# Patient Record
Sex: MALE | NOT HISPANIC OR LATINO | Employment: PART TIME | URBAN - METROPOLITAN AREA
[De-identification: names, ages, dates, MRNs, and addresses within clinical notes are randomized per-mention and may not be internally consistent; named-entity substitution may affect disease eponyms.]

---

## 2017-03-23 ENCOUNTER — TELEPHONE (OUTPATIENT)
Dept: FAMILY MEDICINE | Facility: CLINIC | Age: 47
End: 2017-03-23

## 2017-03-24 ENCOUNTER — OFFICE VISIT (OUTPATIENT)
Dept: FAMILY MEDICINE | Facility: CLINIC | Age: 47
End: 2017-03-24
Payer: OTHER GOVERNMENT

## 2017-03-24 VITALS
HEART RATE: 54 BPM | BODY MASS INDEX: 28.84 KG/M2 | SYSTOLIC BLOOD PRESSURE: 118 MMHG | WEIGHT: 179.44 LBS | OXYGEN SATURATION: 98 % | DIASTOLIC BLOOD PRESSURE: 70 MMHG | HEIGHT: 66 IN

## 2017-03-24 DIAGNOSIS — N39.0 URINARY TRACT INFECTION WITHOUT HEMATURIA, SITE UNSPECIFIED: Primary | ICD-10-CM

## 2017-03-24 DIAGNOSIS — F43.10 PTSD (POST-TRAUMATIC STRESS DISORDER): ICD-10-CM

## 2017-03-24 LAB
BILIRUB SERPL-MCNC: NORMAL MG/DL
BLOOD URINE, POC: NORMAL
COLOR, POC UA: YELLOW
GLUCOSE UR QL STRIP: NORMAL
KETONES UR QL STRIP: NORMAL
LEUKOCYTE ESTERASE URINE, POC: NORMAL
NITRITE, POC UA: NORMAL
PH, POC UA: 5
PROTEIN, POC: NORMAL
SPECIFIC GRAVITY, POC UA: 1.01
UROBILINOGEN, POC UA: NORMAL

## 2017-03-24 PROCEDURE — 99213 OFFICE O/P EST LOW 20 MIN: CPT | Mod: PBBFAC,PO | Performed by: FAMILY MEDICINE

## 2017-03-24 PROCEDURE — 81001 URINALYSIS AUTO W/SCOPE: CPT | Mod: PBBFAC,PO | Performed by: FAMILY MEDICINE

## 2017-03-24 PROCEDURE — 99999 PR PBB SHADOW E&M-EST. PATIENT-LVL III: CPT | Mod: PBBFAC,,, | Performed by: FAMILY MEDICINE

## 2017-03-24 PROCEDURE — 99204 OFFICE O/P NEW MOD 45 MIN: CPT | Mod: S$PBB,,, | Performed by: FAMILY MEDICINE

## 2017-03-24 RX ORDER — HYDROCHLOROTHIAZIDE 12.5 MG/1
12.5 TABLET ORAL DAILY
COMMUNITY
End: 2017-05-22

## 2017-03-24 RX ORDER — METOPROLOL TARTRATE 50 MG/1
50 TABLET ORAL DAILY
COMMUNITY
End: 2017-11-30

## 2017-03-24 RX ORDER — LOSARTAN POTASSIUM 100 MG/1
100 TABLET ORAL DAILY
COMMUNITY

## 2017-03-24 RX ORDER — CIPROFLOXACIN 500 MG/1
500 TABLET ORAL 2 TIMES DAILY
Qty: 20 TABLET | Refills: 1 | Status: SHIPPED | OUTPATIENT
Start: 2017-03-24 | End: 2017-03-31

## 2017-04-03 NOTE — PROGRESS NOTES
A new patient here today.  He is 46 years of age.  He has got a history of PTSD.    He was in the [employer].  He has had a recent urinary tract infection and saw   another primary care physician who did not give him any antibiotics.    PAST MEDICAL, SURGICAL AND SOCIAL HISTORY:  Reviewed.    PHYSICAL EXAMINATION:  No CVA, suprapubic or abdominal tenderness.  Chest clear.    Abdomen soft and nontender.  Pleasant male in no apparent distress.  BMI of   28.  No acute skin rash.  No muscle tenderness or joint swelling.    ASSESSMENT:  Urinary tract infection, PTSD.    We ordered urine dipstick.  We gave him Cipro 500 as directed.  We discussed   blood pressure monitoring, anti-inflammatories.  We discussed the difference   between prostatitis and prostatosis.  We will see him back in followup.  He is a   new patient.      AUTUMN/CHAD  dd: 04/02/2017 21:52:08 (CDT)  td: 04/03/2017 01:37:19 (CDT)  Doc ID   #[id number]  Job ID #[id number]    CC:

## 2017-04-13 ENCOUNTER — OFFICE VISIT (OUTPATIENT)
Dept: FAMILY MEDICINE | Facility: CLINIC | Age: 47
End: 2017-04-13
Payer: OTHER GOVERNMENT

## 2017-04-13 ENCOUNTER — LAB VISIT (OUTPATIENT)
Dept: LAB | Facility: HOSPITAL | Age: 47
End: 2017-04-13
Attending: FAMILY MEDICINE
Payer: OTHER GOVERNMENT

## 2017-04-13 VITALS
BODY MASS INDEX: 28.45 KG/M2 | WEIGHT: 177 LBS | DIASTOLIC BLOOD PRESSURE: 74 MMHG | HEART RATE: 53 BPM | SYSTOLIC BLOOD PRESSURE: 126 MMHG | HEIGHT: 66 IN

## 2017-04-13 DIAGNOSIS — N42.82 PROSTATOSIS SYNDROME: ICD-10-CM

## 2017-04-13 DIAGNOSIS — N42.82 PROSTATOSIS SYNDROME: Primary | ICD-10-CM

## 2017-04-13 DIAGNOSIS — F43.10 PTSD (POST-TRAUMATIC STRESS DISORDER): ICD-10-CM

## 2017-04-13 PROCEDURE — 99213 OFFICE O/P EST LOW 20 MIN: CPT | Mod: PBBFAC,PO | Performed by: FAMILY MEDICINE

## 2017-04-13 PROCEDURE — 87086 URINE CULTURE/COLONY COUNT: CPT

## 2017-04-13 PROCEDURE — 99214 OFFICE O/P EST MOD 30 MIN: CPT | Mod: S$PBB,,, | Performed by: FAMILY MEDICINE

## 2017-04-13 PROCEDURE — 99999 PR PBB SHADOW E&M-EST. PATIENT-LVL III: CPT | Mod: PBBFAC,,, | Performed by: FAMILY MEDICINE

## 2017-04-13 RX ORDER — PHENAZOPYRIDINE HYDROCHLORIDE 200 MG/1
200 TABLET, FILM COATED ORAL 3 TIMES DAILY PRN
Qty: 21 TABLET | Refills: 0 | Status: SHIPPED | OUTPATIENT
Start: 2017-04-13 | End: 2017-04-23

## 2017-04-13 NOTE — MR AVS SNAPSHOT
[facility]  [address] [facility] [address]  [facility] [address]  Phone: [phone]  Fax: [phone]                  Supa Bass   2017 2:40 PM   Office Visit    Description:  Male : 1970   Provider:  Zev Palomino MD   Department:  [facility]           Reason for Visit     Prostate Problem           Diagnoses this Visit        Comments    Prostatosis syndrome    -  Primary     PTSD (post-traumatic stress disorder)                To Do List           Goals (5 Years of Data)     None       These Medications        Disp Refills Start End    phenazopyridine (PYRIDIUM) 200 MG tablet 21 tablet 0 2017    Take 1 tablet (200 mg total) by mouth 3 (three) times daily as needed for Pain. - Oral    Pharmacy: [facility] [address] [place] [address] AT [facility] & [address] #: [phone]         [facility] On Call     [facility] Nurse Care Line -  Assistance  Unless otherwise directed by your provider, please contact [facility], our nurse care line that is available for  assistance.     Registered nurses in the [facility] Center provide: appointment scheduling, clinical advisement, health education, and other advisory services.  Call: [phone] (toll free)               Medications           Message regarding Medications     Verify the changes and/or additions to your medication regime listed below are the same as discussed with your clinician today.  If any of these changes or additions are incorrect, please notify your healthcare provider.        START taking these NEW medications        Refills    phenazopyridine (PYRIDIUM) 200 MG tablet 0    Sig: Take 1 tablet (200 mg total) by mouth 3 (three) times daily as needed for Pain.    Class: Print    Route: Oral           Verify that the below list of medications is an accurate representation of the medications you are currently taking.  If  "none reported, the list may be blank. If incorrect, please contact your healthcare provider. Carry this list with you in case of emergency.           Current Medications     aspirin (ECOTRIN) 81 MG EC tablet Take 81 mg by mouth once daily.    atorvastatin (LIPITOR) 40 MG tablet Take 40 mg by mouth once daily.    buPROPion (WELLBUTRIN SR) 150 MG TBSR 12 hr tablet Take 150 mg by mouth 2 (two) times daily.    esomeprazole (NEXIUM) 40 MG capsule Take 40 mg by mouth once daily.    eszopiclone (LUNESTA) 2 MG Tab Take 2 mg by mouth nightly.    hydrochlorothiazide (HYDRODIURIL) 12.5 MG Tab Take 12.5 mg by mouth once daily.    losartan (COZAAR) 100 MG tablet Take 100 mg by mouth once daily.    metoprolol tartrate (LOPRESSOR) 50 MG tablet Take 50 mg by mouth once daily.    ranitidine (ZANTAC) 150 MG tablet Take 150 mg by mouth daily as needed for Heartburn.    phenazopyridine (PYRIDIUM) 200 MG tablet Take 1 tablet (200 mg total) by mouth 3 (three) times daily as needed for Pain.           Clinical Reference Information           Your Vitals Were     BP Pulse Height Weight BMI    126/74 53 5' 6" (1.676 m) 80.3 kg (177 lb 0.5 oz) 28.57 kg/m2      Blood Pressure          Most Recent Value    BP  126/74      Allergies as of 4/13/2017     No Known Allergies      Immunizations Administered on Date of Encounter - 4/13/2017     None      Orders Placed During Today's Visit      Normal Orders This Visit    Ambulatory referral to Urology     Future Labs/Procedures Expected by Expires    Urine culture  4/13/2017 6/12/2018      [facility] Sign-Up     Activating your [facility] account is as easy as 1-2-3!     1) Visit [email or web address], select Sign Up Now, enter this activation code and your date of birth, then select Next.  [email or web address]  Expires: 5/28/2017  3:27 PM      2) Create a username and password to use when you visit [facility] in the future and select a security question in case you lose your password and select Next.    3) Enter " your e-mail address and click Sign Up!    Additional Information  If you have questions, please e-mail [email or web address] or call [phone] to talk to our [facility] staff. Remember, [facility] is NOT to be used for urgent needs. For medical emergencies, dial 911.         Language Assistance Services     ATTENTION: Language assistance services are available, free of charge. Please call [phone].      ATENCIÓN: Si habla rafyañol, tiene a lugo disposición servicios gratuitos de asistencia lingüística. Llame al [phone].     CHÚ Ý: N?u b?n nói Ti?ng Vi?t, có các d?ch v? h? tr? ngôn ng? mi?n phí dành cho b?n. G?i s? [phone].         [facility] complies with applicable Federal civil rights laws and does not discriminate on the basis of race, color, national origin, age, disability, or sex.

## 2017-04-15 LAB — BACTERIA UR CULT: NORMAL

## 2017-04-20 ENCOUNTER — TELEPHONE (OUTPATIENT)
Dept: FAMILY MEDICINE | Facility: CLINIC | Age: 47
End: 2017-04-20

## 2017-04-20 NOTE — TELEPHONE ENCOUNTER
----- Message from Purnima Sam sent at 4/19/2017  4:06 PM CDT -----  Contact: Patient  Patient called regarding referral to see urologist stated that he will received a call. Please call back at [phone]. I tried to schedule patient stated to far out. Thanks,

## 2017-04-24 NOTE — PROGRESS NOTES
Pt seen note to be done HISTORY OF PRESENT ILLNESS:  A 46-year-old male here today.  He has got a   history of prostatosis, possible prostatitis.  He also has PTSD.  He is doing   well on his present medications.  He is not having any penile discharge.  No   back pain.  No hematuria.  He has had some urgency.  He took Cipro 500 b.i.d.   for the past two weeks or so with no help.    PHYSICAL EXAMINATION:  Abdomen, penis, and scrotum unremarkable.  Chest is   clear.  Affect was pleasant and polite.  No CVA or suprapubic discomfort.  No   rash.    ASSESSMENT:  Prostatosis and PTSD.    PLAN:  We will have Urology seen.  We ordered a urine culture.  If anything   worsens, he will need to bees seen again.  We will consider doxycycline, but at   this point, I would like to see the urine culture first.      GRIFFIN  dd: 04/25/2017 14:48:07 (CDT)  td: 04/25/2017 23:25:23 (CDT)  Doc ID   #[id number]  Job ID #[id number]    CC:

## 2017-04-28 ENCOUNTER — OFFICE VISIT (OUTPATIENT)
Dept: UROLOGY | Facility: CLINIC | Age: 47
End: 2017-04-28
Payer: OTHER GOVERNMENT

## 2017-04-28 VITALS
HEIGHT: 66 IN | DIASTOLIC BLOOD PRESSURE: 87 MMHG | WEIGHT: 176.38 LBS | HEART RATE: 48 BPM | SYSTOLIC BLOOD PRESSURE: 126 MMHG | BODY MASS INDEX: 28.34 KG/M2

## 2017-04-28 DIAGNOSIS — N40.1 BPH (BENIGN PROSTATIC HYPERTROPHY) WITH URINARY OBSTRUCTION: ICD-10-CM

## 2017-04-28 DIAGNOSIS — R35.0 INCREASED URINARY FREQUENCY: Primary | ICD-10-CM

## 2017-04-28 DIAGNOSIS — N13.8 BPH (BENIGN PROSTATIC HYPERTROPHY) WITH URINARY OBSTRUCTION: ICD-10-CM

## 2017-04-28 DIAGNOSIS — R39.15 URINARY URGENCY: ICD-10-CM

## 2017-04-28 DIAGNOSIS — R39.12 WEAK URINE STREAM: ICD-10-CM

## 2017-04-28 LAB
BILIRUB SERPL-MCNC: NORMAL MG/DL
BLOOD URINE, POC: NORMAL
COLOR, POC UA: YELLOW
GLUCOSE UR QL STRIP: NORMAL
KETONES UR QL STRIP: NORMAL
LEUKOCYTE ESTERASE URINE, POC: NORMAL
NITRITE, POC UA: NORMAL
PH, POC UA: 5
PROTEIN, POC: NORMAL
SPECIFIC GRAVITY, POC UA: 1.03
UROBILINOGEN, POC UA: NORMAL

## 2017-04-28 PROCEDURE — 81002 URINALYSIS NONAUTO W/O SCOPE: CPT | Mod: PBBFAC,PO | Performed by: UROLOGY

## 2017-04-28 PROCEDURE — 99213 OFFICE O/P EST LOW 20 MIN: CPT | Mod: PBBFAC,PO | Performed by: UROLOGY

## 2017-04-28 PROCEDURE — 99204 OFFICE O/P NEW MOD 45 MIN: CPT | Mod: S$PBB,,, | Performed by: UROLOGY

## 2017-04-28 PROCEDURE — 99999 PR PBB SHADOW E&M-EST. PATIENT-LVL III: CPT | Mod: PBBFAC,,, | Performed by: UROLOGY

## 2017-04-28 RX ORDER — PHENAZOPYRIDINE HYDROCHLORIDE 200 MG/1
200 TABLET, FILM COATED ORAL 3 TIMES DAILY PRN
COMMUNITY
End: 2017-04-28 | Stop reason: SDUPTHER

## 2017-04-28 RX ORDER — PHENAZOPYRIDINE HYDROCHLORIDE 200 MG/1
200 TABLET, FILM COATED ORAL 3 TIMES DAILY PRN
Qty: 30 TABLET | Refills: 1 | Status: SHIPPED | OUTPATIENT
Start: 2017-04-28 | End: 2017-06-23

## 2017-04-28 NOTE — LETTER
April 28, 2017      Zev Palomino MD  [address] [facility] [address] [place] [address]           [facility]  [address] [facility] [address] [place] [address]  Phone: [phone]          Patient: Supa Bass   MR Number: [id number]   YOB: 1970   Date of Visit: 4/28/2017       Dear Dr. Zev Palomino:    Thank you for referring Supa Bass to me for evaluation. Attached you will find relevant portions of my assessment and plan of care.    If you have questions, please do not hesitate to call me. I look forward to following Supa Bass along with you.    Sincerely,    ARELIS Wang MD    Enclosure  CC:  No Recipients    If you would like to receive this communication electronically, please contact [email or web address] or [phone] to request more information on [facility] access.    For providers and/or their staff who would like to refer a patient to [facility], please contact us through our one-stop-shop provider referral line, [facility], at [phone].    If you feel you have received this communication in error or would no longer like to receive these types of communications, please e-mail [email or web address]

## 2017-04-28 NOTE — PROGRESS NOTES
Subjective:       Patient ID: Supa Bass is a 46 y.o. male.    Chief Complaint: Prostatitis    HPI     46 year old with a 4 month history of urinary urgency, weak flow and straining to urinate.  He was initially seen by his PCP at [facility].  He then had shoulder surgery and was lost to follow up.  He then established new PCP at here.  He was treated for prostatitis with 20 days of Cipro.  He had no improvement in his symptoms.  He also complains of an achiness in testes (back of left testes.  He was given pyridium which seems to improve his symptoms.  He denies gross hematuria and no dysuria.  Urine culture is negative.  He had history of stones 20 years ago passed.   His symptoms are intermittent.  No constipation.   [facility]  No previous UTIs.  No STDs.  No new sexual partners he denies urethral discharge.  Urine dipstick shows negative for all components.    Past Medical History:   Diagnosis Date    GERD (gastroesophageal reflux disease)     Hearing loss     HTN (hypertension)     Hyperlipidemia     RAUL (obstructive sleep apnea)     CPAP at night    PTSD (post-traumatic stress disorder)      Past Surgical History:   Procedure Laterality Date    ELBOW SURGERY      right    FINGER SURGERY      right ring finger    FOOT SURGERY      right       Current Outpatient Prescriptions:     aspirin (ECOTRIN) 81 MG EC tablet, Take 81 mg by mouth once daily., Disp: , Rfl:     atorvastatin (LIPITOR) 40 MG tablet, Take 40 mg by mouth once daily., Disp: , Rfl:     buPROPion (WELLBUTRIN SR) 150 MG TBSR 12 hr tablet, Take 150 mg by mouth 2 (two) times daily., Disp: , Rfl:     esomeprazole (NEXIUM) 40 MG capsule, Take 40 mg by mouth once daily., Disp: , Rfl:     eszopiclone (LUNESTA) 2 MG Tab, Take 2 mg by mouth nightly., Disp: , Rfl:     losartan (COZAAR) 100 MG tablet, Take 100 mg by mouth once daily., Disp: , Rfl:     metoprolol tartrate (LOPRESSOR) 50 MG tablet, Take 50 mg by mouth  once daily., Disp: , Rfl:     phenazopyridine (PYRIDIUM) 200 MG tablet, Take 1 tablet (200 mg total) by mouth 3 (three) times daily as needed for Pain., Disp: 30 tablet, Rfl: 1    ranitidine (ZANTAC) 150 MG tablet, Take 150 mg by mouth daily as needed for Heartburn., Disp: , Rfl:     hydrochlorothiazide (HYDRODIURIL) 12.5 MG Tab, Take 12.5 mg by mouth once daily., Disp: , Rfl:       Review of Systems   Constitutional: Negative for fever.   Eyes: Negative for visual disturbance.   Respiratory: Negative for shortness of breath.    Cardiovascular: Negative for chest pain.   Gastrointestinal: Negative for nausea.   Genitourinary: Positive for frequency. Negative for dysuria and hematuria.   Musculoskeletal: Negative for gait problem.   Skin: Negative for rash.   Neurological: Negative for seizures.   Psychiatric/Behavioral: Negative for confusion.       Objective:      Physical Exam   Constitutional: He is oriented to person, place, and time. He appears well-developed and well-nourished.   HENT:   Head: Normocephalic and atraumatic.   Eyes: Conjunctivae are normal.   Cardiovascular: Normal rate.    Pulmonary/Chest: Effort normal.   Abdominal: Hernia confirmed negative in the right inguinal area and confirmed negative in the left inguinal area.   Genitourinary: Testes normal and penis normal. Rectal exam shows no mass and anal tone normal. Prostate is enlarged (30g s/s/a). Prostate is not tender.   Musculoskeletal: Normal range of motion.   Lymphadenopathy: No inguinal adenopathy noted on the right or left side.   Neurological: He is alert and oriented to person, place, and time.   Skin: Skin is warm and dry. No rash noted.   Psychiatric: He has a normal mood and affect.   Vitals reviewed.      Assessment:       1. Increased urinary frequency    2. Urinary urgency    3. Weak urine stream    4. BPH (benign prostatic hypertrophy) with urinary obstruction        Plan:       Increased urinary frequency  -     US  Retroperitoneal Complete (Kidney and; Future; Expected date: 4/28/17    Urinary urgency    Weak urine stream    BPH (benign prostatic hypertrophy) with urinary obstruction    Other orders  -     phenazopyridine (PYRIDIUM) 200 MG tablet; Take 1 tablet (200 mg total) by mouth 3 (three) times daily as needed for Pain.  Dispense: 30 tablet; Refill: 1      Source of symptoms unclear.  ?stone.  Rec SHIRAZ and cystoscopy.

## 2017-05-17 ENCOUNTER — HOSPITAL ENCOUNTER (OUTPATIENT)
Dept: RADIOLOGY | Facility: HOSPITAL | Age: 47
Discharge: HOME | End: 2017-05-17
Attending: UROLOGY
Payer: OTHER GOVERNMENT

## 2017-05-17 DIAGNOSIS — R35.0 INCREASED URINARY FREQUENCY: ICD-10-CM

## 2017-05-17 PROCEDURE — 76770 US EXAM ABDO BACK WALL COMP: CPT | Mod: TC,PO

## 2017-05-17 PROCEDURE — 76770 US EXAM ABDO BACK WALL COMP: CPT | Mod: 26,,, | Performed by: RADIOLOGY

## 2017-05-18 ENCOUNTER — TELEPHONE (OUTPATIENT)
Dept: UROLOGY | Facility: CLINIC | Age: 47
End: 2017-05-18

## 2017-05-22 ENCOUNTER — TELEPHONE (OUTPATIENT)
Dept: FAMILY MEDICINE | Facility: CLINIC | Age: 47
End: 2017-05-22

## 2017-05-22 ENCOUNTER — PATIENT MESSAGE (OUTPATIENT)
Dept: FAMILY MEDICINE | Facility: CLINIC | Age: 47
End: 2017-05-22

## 2017-05-22 ENCOUNTER — PROCEDURE VISIT (OUTPATIENT)
Dept: UROLOGY | Facility: CLINIC | Age: 47
End: 2017-05-22
Payer: OTHER GOVERNMENT

## 2017-05-22 VITALS
BODY MASS INDEX: 28.7 KG/M2 | SYSTOLIC BLOOD PRESSURE: 118 MMHG | HEART RATE: 51 BPM | HEIGHT: 66 IN | DIASTOLIC BLOOD PRESSURE: 82 MMHG | WEIGHT: 178.56 LBS

## 2017-05-22 DIAGNOSIS — R39.15 URINARY URGENCY: ICD-10-CM

## 2017-05-22 DIAGNOSIS — N40.1 BPH (BENIGN PROSTATIC HYPERTROPHY) WITH URINARY OBSTRUCTION: ICD-10-CM

## 2017-05-22 DIAGNOSIS — N13.8 BPH (BENIGN PROSTATIC HYPERTROPHY) WITH URINARY OBSTRUCTION: ICD-10-CM

## 2017-05-22 DIAGNOSIS — R35.0 INCREASED URINARY FREQUENCY: ICD-10-CM

## 2017-05-22 DIAGNOSIS — R39.12 WEAK URINE STREAM: ICD-10-CM

## 2017-05-22 PROCEDURE — 52000 CYSTOURETHROSCOPY: CPT | Mod: PBBFAC,PO | Performed by: UROLOGY

## 2017-05-22 PROCEDURE — 81002 URINALYSIS NONAUTO W/O SCOPE: CPT | Mod: PBBFAC,PO | Performed by: UROLOGY

## 2017-05-22 RX ORDER — CYCLOBENZAPRINE HCL 10 MG
TABLET ORAL
COMMUNITY
Start: 2017-03-07 | End: 2017-05-22

## 2017-05-22 NOTE — TELEPHONE ENCOUNTER
"Good Afternoon Dr. Palomino,     I have been experiencing some issues with my CPAP machine, specifically in the last month or so I have been feeling out of breath when I use it and "snoring" through the mask.  I contacted [facility], who is the provider for my sleep apnea needs, and Lydia stated that there may be a need to increase my CPAP pressure.  To do so, they require a request from my primary care physician.  Lydia stated that they would be sending your office a fax requesting an order for a pressure change so they could fix the problem.  I am requesting that you provide them with this order.  If you have questions or concerns, I will be glad to make an appointment or speak with you or your staff as required.  Thank you in advance for your assistance with this matter.     SIncerely,     Supa Bass   [phone]           "

## 2017-05-22 NOTE — PROCEDURES
"Cystoscopy  Date/Time: 5/22/2017 10:06 AM  Performed by: ARELIS SETH  Authorized by: ARELIS SETH     Consent Done?:  Yes (Written)  Time out: Immediately prior to procedure a "time out" was called to verify the correct patient, procedure, equipment, support staff and site/side marked as required.    Indications: overactive bladder and BPH    Position:  Supine  Anesthesia:  Lidocaine jelly  Preparation: Patient was prepped and draped in usual sterile fashion      Scope type:  Flexible cystoscope    Patient tolerance:  Patient tolerated the procedure well with no immediate complications     The flexible cystoscope was placed into the urethra and carefully advanced into the bladder.  A careful cystoscopic exam was then performed.  The entire bladder mucosa was systematically visualized.  Findings include mild bladder wall trabeculation.  There were no lesions, masses foreign bodies or stones.   Each ureteral orifices were visualized and both had clear efflux of urine.   The cystoscope was then removed and I examined the entire length of the urethra.  There was mild trilobar enlargement of the prostate otherwise the urethra appeared normal.  He tolerated the procedure well.  There were no complications    Impression:  Normal cystoscopy.        "

## 2017-05-24 ENCOUNTER — PATIENT MESSAGE (OUTPATIENT)
Dept: FAMILY MEDICINE | Facility: CLINIC | Age: 47
End: 2017-05-24

## 2017-05-30 ENCOUNTER — PATIENT MESSAGE (OUTPATIENT)
Dept: UROLOGY | Facility: CLINIC | Age: 47
End: 2017-05-30

## 2017-06-01 ENCOUNTER — PATIENT MESSAGE (OUTPATIENT)
Dept: UROLOGY | Facility: CLINIC | Age: 47
End: 2017-06-01

## 2017-06-01 RX ORDER — TAMSULOSIN HYDROCHLORIDE 0.4 MG/1
0.4 CAPSULE ORAL DAILY
Qty: 30 CAPSULE | Refills: 11 | Status: SHIPPED | OUTPATIENT
Start: 2017-06-01 | End: 2017-06-23

## 2017-06-20 ENCOUNTER — PATIENT MESSAGE (OUTPATIENT)
Dept: FAMILY MEDICINE | Facility: CLINIC | Age: 47
End: 2017-06-20

## 2017-06-23 ENCOUNTER — OFFICE VISIT (OUTPATIENT)
Dept: FAMILY MEDICINE | Facility: CLINIC | Age: 47
End: 2017-06-23
Payer: OTHER GOVERNMENT

## 2017-06-23 VITALS
RESPIRATION RATE: 18 BRPM | DIASTOLIC BLOOD PRESSURE: 82 MMHG | OXYGEN SATURATION: 96 % | WEIGHT: 181.88 LBS | BODY MASS INDEX: 29.23 KG/M2 | HEART RATE: 52 BPM | HEIGHT: 66 IN | SYSTOLIC BLOOD PRESSURE: 128 MMHG

## 2017-06-23 DIAGNOSIS — I10 ESSENTIAL HYPERTENSION: ICD-10-CM

## 2017-06-23 DIAGNOSIS — G47.33 OSA ON CPAP: Primary | ICD-10-CM

## 2017-06-23 DIAGNOSIS — F43.10 PTSD (POST-TRAUMATIC STRESS DISORDER): ICD-10-CM

## 2017-06-23 PROCEDURE — 99999 PR PBB SHADOW E&M-EST. PATIENT-LVL III: CPT | Mod: PBBFAC,,, | Performed by: INTERNAL MEDICINE

## 2017-06-23 PROCEDURE — 99214 OFFICE O/P EST MOD 30 MIN: CPT | Mod: S$PBB,,, | Performed by: INTERNAL MEDICINE

## 2017-06-23 PROCEDURE — 99213 OFFICE O/P EST LOW 20 MIN: CPT | Mod: PBBFAC,PO | Performed by: INTERNAL MEDICINE

## 2017-06-23 NOTE — PROGRESS NOTES
Subjective:       Patient ID: Supa Bass is a 47 y.o. male.    Chief Complaint: Establish Care and Sleep Apnea (f/u)    [facility]    RAUL on cpap - not working right and needs a referral  HTN - controlled  PTSD from war - controlled on Wellbutrin and Lunesta  HLD - on statin    Will email FLP       Review of Systems   Constitutional: Negative for activity change, appetite change, fever and unexpected weight change.   HENT: Negative for hearing loss, nosebleeds, rhinorrhea and trouble swallowing.    Eyes: Negative for discharge and visual disturbance.   Respiratory: Negative for choking, chest tightness, shortness of breath and wheezing.    Cardiovascular: Negative for chest pain and palpitations.   Gastrointestinal: Negative for abdominal pain, blood in stool, constipation, diarrhea, nausea and vomiting.   Endocrine: Negative for polydipsia and polyuria.   Genitourinary: Negative for difficulty urinating, hematuria and urgency.   Musculoskeletal: Negative for arthralgias, joint swelling and neck pain.   Skin: Negative for rash and wound.   Neurological: Negative for dizziness, syncope, weakness and headaches.   Psychiatric/Behavioral: Negative for confusion and dysphoric mood.       Objective:      Vitals:    06/23/17 1255   BP: 128/82   Pulse: (!) 52   Resp: 18     Physical Exam   Constitutional: He appears well-nourished.   Eyes: Conjunctivae and EOM are normal.   Neck: Normal range of motion.   Cardiovascular: Normal rate and regular rhythm.    Pulmonary/Chest: Effort normal and breath sounds normal.   Musculoskeletal:   Normal ROM bilateral    Neurological: No cranial nerve deficit (grossly intact).   Skin: Skin is warm and dry.   Psychiatric: He has a normal mood and affect.   Alert and orientated   Vitals reviewed.        Assessment:       1. RAUL on CPAP    2. Essential hypertension    3. PTSD (post-traumatic stress disorder)      "   Plan:       RAUL on CPAP  -     Ambulatory consult for Sleep Study    Essential hypertension    PTSD (post-traumatic stress disorder)    Continue current plan of care  Serial blood pressure monitoring          Counseled on regular exercise, maintenance of a healthy weight, balanced diet rich in fruits/vegetables and lean protein, and avoidance of unhealthy habits like smoking and excessive alcohol intake.   Also, counseled on importance of being compliant with medication, health appointments, diet and exercise.     Return if symptoms worsen or fail to improve.    "This note will not be shared with the patient."  "

## 2017-08-07 ENCOUNTER — OFFICE VISIT (OUTPATIENT)
Dept: URGENT CARE | Facility: CLINIC | Age: 47
End: 2017-08-07
Payer: OTHER GOVERNMENT

## 2017-08-07 VITALS
HEIGHT: 66 IN | HEART RATE: 61 BPM | BODY MASS INDEX: 28.93 KG/M2 | WEIGHT: 180 LBS | DIASTOLIC BLOOD PRESSURE: 84 MMHG | RESPIRATION RATE: 18 BRPM | TEMPERATURE: 98 F | OXYGEN SATURATION: 96 % | SYSTOLIC BLOOD PRESSURE: 125 MMHG

## 2017-08-07 DIAGNOSIS — L72.9 INFECTED CYST OF SKIN: Primary | ICD-10-CM

## 2017-08-07 DIAGNOSIS — L08.9 INFECTED CYST OF SKIN: Primary | ICD-10-CM

## 2017-08-07 PROCEDURE — 3079F DIAST BP 80-89 MM HG: CPT | Mod: S$GLB,,, | Performed by: FAMILY MEDICINE

## 2017-08-07 PROCEDURE — 3008F BODY MASS INDEX DOCD: CPT | Mod: S$GLB,,, | Performed by: FAMILY MEDICINE

## 2017-08-07 PROCEDURE — 3074F SYST BP LT 130 MM HG: CPT | Mod: S$GLB,,, | Performed by: FAMILY MEDICINE

## 2017-08-07 PROCEDURE — 99214 OFFICE O/P EST MOD 30 MIN: CPT | Mod: S$GLB,,, | Performed by: FAMILY MEDICINE

## 2017-08-07 RX ORDER — SULFAMETHOXAZOLE AND TRIMETHOPRIM 800; 160 MG/1; MG/1
1 TABLET ORAL 2 TIMES DAILY
Qty: 20 TABLET | Refills: 0 | Status: SHIPPED | OUTPATIENT
Start: 2017-08-07 | End: 2017-11-17 | Stop reason: ALTCHOICE

## 2017-08-07 RX ORDER — MUPIROCIN 20 MG/G
OINTMENT TOPICAL
Qty: 22 G | Refills: 1 | Status: SHIPPED | OUTPATIENT
Start: 2017-08-07 | End: 2017-11-17

## 2017-08-07 NOTE — PROGRESS NOTES
"Subjective:       Patient ID: Supa Bass is a 47 y.o. male.    Vitals:  height is 5' 6" (1.676 m) and weight is 81.6 kg (180 lb). His temperature is 98 °F (36.7 °C). His blood pressure is 125/84 and his pulse is 61. His respiration is 18 and oxygen saturation is 96%.     Chief Complaint: Insect Bite    Possible spider bite to left side of back. Red/purple and tender.      Insect Bite   This is a new problem. The current episode started in the past 7 days. The problem occurs constantly. The problem has been gradually worsening. Pertinent negatives include no abdominal pain, chest pain, chills, fever, headaches, nausea, rash, sore throat or vomiting. He has tried nothing for the symptoms.     Review of Systems   Constitution: Negative for chills and fever.   HENT: Negative for headaches and sore throat.    Eyes: Negative for blurred vision.   Cardiovascular: Negative for chest pain.   Respiratory: Negative for shortness of breath.    Skin: Positive for color change. Negative for rash.   Musculoskeletal: Negative for back pain and joint pain.   Gastrointestinal: Negative for abdominal pain, diarrhea, nausea and vomiting.   Psychiatric/Behavioral: The patient is not nervous/anxious.        Objective:      Physical Exam   Constitutional: He is oriented to person, place, and time. He appears well-developed and well-nourished.   HENT:   Head: Normocephalic and atraumatic.   Right Ear: External ear normal.   Left Ear: External ear normal.   Nose: Nose normal.   Mouth/Throat: Oropharynx is clear and moist.   Eyes: Conjunctivae, EOM and lids are normal. Pupils are equal, round, and reactive to light.   Neck: Trachea normal, full passive range of motion without pain and phonation normal. Neck supple.   Musculoskeletal: Normal range of motion.   Neurological: He is alert and oriented to person, place, and time.   Skin: Skin is warm, dry and intact. Lesion noted. There is erythema.        Infected cysts. Some cheesy " d/c expressed when squeezed   Psychiatric: He has a normal mood and affect. His speech is normal and behavior is normal. Judgment and thought content normal. Cognition and memory are normal.   Nursing note and vitals reviewed.      Assessment:       1. Infected cyst of skin        Plan:         Infected cyst of skin    Other orders  -     mupirocin (BACTROBAN) 2 % ointment; Apply to affected area 3 times daily  Dispense: 22 g; Refill: 1  -     sulfamethoxazole-trimethoprim 800-160mg (BACTRIM DS) 800-160 mg Tab; Take 1 tablet by mouth 2 (two) times daily.  Dispense: 20 tablet; Refill: 0

## 2017-08-07 NOTE — PATIENT INSTRUCTIONS
Abscess/cellulitis    If you have been diagnosed with an abscess, this is an infection which causes a collection of pus under the skin. Symptoms include swelling, redness and pain.   If your abscess does not need to be opened and drained, in the next few days, your absess MAY improve with the antibiotics and warm compresses, OR it may collect in a manner which will necessitate opening and draining it.   If it was drained, you should return in 2-3 days to have the packing removed.     If you were diagnosed with cellulitis, this is an infection in the layers of the skin. It is usually the result of an infected bite or injury to the top surface of the skin or from poor venous circulation.     You should take your antibiotics as directed - until gone.  You should follow-up with your Primary Care Doctor in 2-3 days for a re-check.  Watch for worseniong symptoms such as increased swelling, worsening of infection, increased pain, red streaks, and fever.   Go to the ER if you suddenly worsen with severe symptoms

## 2017-11-17 ENCOUNTER — OFFICE VISIT (OUTPATIENT)
Dept: FAMILY MEDICINE | Facility: CLINIC | Age: 47
End: 2017-11-17
Payer: OTHER GOVERNMENT

## 2017-11-17 ENCOUNTER — LAB VISIT (OUTPATIENT)
Dept: LAB | Facility: HOSPITAL | Age: 47
End: 2017-11-17
Attending: INTERNAL MEDICINE
Payer: OTHER GOVERNMENT

## 2017-11-17 VITALS
TEMPERATURE: 99 F | DIASTOLIC BLOOD PRESSURE: 84 MMHG | BODY MASS INDEX: 29.51 KG/M2 | HEART RATE: 54 BPM | HEIGHT: 66 IN | SYSTOLIC BLOOD PRESSURE: 122 MMHG | WEIGHT: 183.63 LBS | RESPIRATION RATE: 18 BRPM | OXYGEN SATURATION: 96 %

## 2017-11-17 DIAGNOSIS — Z00.00 ROUTINE PHYSICAL EXAMINATION: Primary | ICD-10-CM

## 2017-11-17 DIAGNOSIS — I10 ESSENTIAL HYPERTENSION: ICD-10-CM

## 2017-11-17 DIAGNOSIS — Z12.5 ENCOUNTER FOR SCREENING FOR MALIGNANT NEOPLASM OF PROSTATE: ICD-10-CM

## 2017-11-17 DIAGNOSIS — E78.5 DYSLIPIDEMIA: ICD-10-CM

## 2017-11-17 DIAGNOSIS — Z00.00 ROUTINE PHYSICAL EXAMINATION: ICD-10-CM

## 2017-11-17 DIAGNOSIS — J06.9 UPPER RESPIRATORY TRACT INFECTION, UNSPECIFIED TYPE: ICD-10-CM

## 2017-11-17 LAB
ALBUMIN SERPL BCP-MCNC: 4 G/DL
ALP SERPL-CCNC: 114 U/L
ALT SERPL W/O P-5'-P-CCNC: 64 U/L
ANION GAP SERPL CALC-SCNC: 8 MMOL/L
AST SERPL-CCNC: 40 U/L
BASOPHILS # BLD AUTO: 0.04 K/UL
BASOPHILS NFR BLD: 0.6 %
BILIRUB SERPL-MCNC: 0.7 MG/DL
BUN SERPL-MCNC: 23 MG/DL
CALCIUM SERPL-MCNC: 9.7 MG/DL
CHLORIDE SERPL-SCNC: 106 MMOL/L
CHOLEST SERPL-MCNC: 151 MG/DL
CHOLEST/HDLC SERPL: 5.8 {RATIO}
CO2 SERPL-SCNC: 29 MMOL/L
COMPLEXED PSA SERPL-MCNC: 0.49 NG/ML
CREAT SERPL-MCNC: 1.4 MG/DL
DIFFERENTIAL METHOD: ABNORMAL
EOSINOPHIL # BLD AUTO: 0.1 K/UL
EOSINOPHIL NFR BLD: 1.4 %
ERYTHROCYTE [DISTWIDTH] IN BLOOD BY AUTOMATED COUNT: 11.5 %
EST. GFR  (AFRICAN AMERICAN): >60 ML/MIN/1.73 M^2
EST. GFR  (NON AFRICAN AMERICAN): 59.8 ML/MIN/1.73 M^2
GLUCOSE SERPL-MCNC: 110 MG/DL
HCT VFR BLD AUTO: 44.6 %
HDLC SERPL-MCNC: 26 MG/DL
HDLC SERPL: 17.2 %
HGB BLD-MCNC: 15.6 G/DL
IMM GRANULOCYTES # BLD AUTO: 0.01 K/UL
IMM GRANULOCYTES NFR BLD AUTO: 0.2 %
LDLC SERPL CALC-MCNC: ABNORMAL MG/DL
LYMPHOCYTES # BLD AUTO: 2.4 K/UL
LYMPHOCYTES NFR BLD: 36.2 %
MCH RBC QN AUTO: 32.6 PG
MCHC RBC AUTO-ENTMCNC: 35 G/DL
MCV RBC AUTO: 93 FL
MONOCYTES # BLD AUTO: 0.8 K/UL
MONOCYTES NFR BLD: 12 %
NEUTROPHILS # BLD AUTO: 3.2 K/UL
NEUTROPHILS NFR BLD: 49.6 %
NONHDLC SERPL-MCNC: 125 MG/DL
NRBC BLD-RTO: 0 /100 WBC
PLATELET # BLD AUTO: 185 K/UL
PMV BLD AUTO: 10.5 FL
POTASSIUM SERPL-SCNC: 4.1 MMOL/L
PROT SERPL-MCNC: 7.8 G/DL
RBC # BLD AUTO: 4.79 M/UL
SODIUM SERPL-SCNC: 143 MMOL/L
TRIGL SERPL-MCNC: 441 MG/DL
WBC # BLD AUTO: 6.5 K/UL

## 2017-11-17 PROCEDURE — 36415 COLL VENOUS BLD VENIPUNCTURE: CPT | Mod: PO

## 2017-11-17 PROCEDURE — 99999 PR PBB SHADOW E&M-EST. PATIENT-LVL III: CPT | Mod: PBBFAC,,, | Performed by: INTERNAL MEDICINE

## 2017-11-17 PROCEDURE — 84153 ASSAY OF PSA TOTAL: CPT

## 2017-11-17 PROCEDURE — 80061 LIPID PANEL: CPT

## 2017-11-17 PROCEDURE — 99396 PREV VISIT EST AGE 40-64: CPT | Mod: S$PBB,,, | Performed by: INTERNAL MEDICINE

## 2017-11-17 PROCEDURE — 85025 COMPLETE CBC W/AUTO DIFF WBC: CPT

## 2017-11-17 PROCEDURE — 80053 COMPREHEN METABOLIC PANEL: CPT

## 2017-11-17 PROCEDURE — 99213 OFFICE O/P EST LOW 20 MIN: CPT | Mod: PBBFAC,PO | Performed by: INTERNAL MEDICINE

## 2017-11-17 RX ORDER — METHYLPREDNISOLONE 4 MG/1
TABLET ORAL
Qty: 21 TABLET | Refills: 0 | Status: SHIPPED | OUTPATIENT
Start: 2017-11-17 | End: 2017-11-30 | Stop reason: ALTCHOICE

## 2017-11-17 RX ORDER — CEFUROXIME AXETIL 500 MG/1
500 TABLET ORAL 2 TIMES DAILY
Qty: 20 TABLET | Refills: 0 | Status: SHIPPED | OUTPATIENT
Start: 2017-11-17 | End: 2017-11-27

## 2017-11-17 RX ORDER — BENZONATATE 100 MG/1
CAPSULE ORAL
Qty: 45 CAPSULE | Refills: 0 | Status: SHIPPED | OUTPATIENT
Start: 2017-11-17 | End: 2017-11-30

## 2017-11-17 NOTE — PROGRESS NOTES
Subjective:       Patient ID: Supa Bass is a 47 y.o. male.    Chief Complaint: Employment Physical; Nasal Congestion; and Cough    Here for routine health maintenance.      Complains of a moderate sore throat for more than 3 days.  It is associated with posterior nasal drainage and a cough     [facility] and [facility] for acute issues; will bring/send labs to us    RAUL on cpap - not working right and needs a referral  HTN - controlled  PTSD from war - controlled on Wellbutrin and Lunesta  HLD - on statin      Review of Systems   Constitutional: Negative for appetite change and fever.   HENT: Positive for sinus pressure and sore throat. Negative for nosebleeds and trouble swallowing.    Eyes: Negative for discharge and visual disturbance.   Respiratory: Positive for cough. Negative for choking and shortness of breath.    Cardiovascular: Negative for chest pain and palpitations.   Gastrointestinal: Negative for abdominal pain, nausea and vomiting.   Musculoskeletal: Negative for arthralgias and joint swelling.   Skin: Negative for rash and wound.   Neurological: Negative for dizziness and syncope.   Psychiatric/Behavioral: Negative for confusion and dysphoric mood.       Objective:      Vitals:    11/17/17 0924   BP: 122/84   Pulse: (!) 54   Resp: 18   Temp: 98.5 °F (36.9 °C)     Physical Exam   Constitutional: He appears well-nourished.   HENT:   Right Ear: Tympanic membrane normal.   Left Ear: Tympanic membrane normal.   Mouth/Throat: Posterior oropharyngeal erythema present.   Eyes: Conjunctivae and EOM are normal.   Neck: Trachea normal and normal range of motion. No thyromegaly present.   Cardiovascular: Normal rate, regular rhythm and normal heart sounds.    Edema negative   Pulmonary/Chest: Effort normal and breath sounds normal.   Abdominal: Soft. There is no hepatomegaly.   Musculoskeletal:   Normal ROM bilateral    Lymphadenopathy:        Head (right side):  No tonsillar adenopathy present.        Head (left side): No tonsillar adenopathy present.        Right: No supraclavicular adenopathy present.        Left: No supraclavicular adenopathy present.   Neurological: No cranial nerve deficit (grossly intact).   Skin: Skin is warm, dry and intact.   Psychiatric: He has a normal mood and affect.   Alert and orientated   Vitals reviewed.        Assessment:       1. Routine physical examination    2. Upper respiratory tract infection, unspecified type    3. Essential hypertension    4. Dyslipidemia    5. Encounter for screening for malignant neoplasm of prostate        Plan:       Routine physical examination  -     Comprehensive metabolic panel; Future; Expected date: 11/17/2017  -     Lipid panel; Future; Expected date: 11/17/2017  -     PSA, Screening; Future; Expected date: 11/17/2017  -     CBC auto differential; Future; Expected date: 11/17/2017    Upper respiratory tract infection, unspecified type  -     CBC auto differential; Future; Expected date: 11/17/2017  -     methylPREDNISolone (MEDROL DOSEPACK) 4 mg tablet; Take as directed  Dispense: 21 tablet; Refill: 0  -     benzonatate (TESSALON) 100 MG capsule; 1 - 2 po every 6 hours prn cough  Dispense: 45 capsule; Refill: 0  -     cefUROXime (CEFTIN) 500 MG tablet; Take 1 tablet (500 mg total) by mouth 2 (two) times daily.  Dispense: 20 tablet; Refill: 0    Essential hypertension  -     Comprehensive metabolic panel; Future; Expected date: 11/17/2017    Dyslipidemia  -     Lipid panel; Future; Expected date: 11/17/2017    Encounter for screening for malignant neoplasm of prostate  -     PSA, Screening; Future; Expected date: 11/17/2017    wellness reviewed          Counseled on regular exercise, maintenance of a healthy weight, balanced diet rich in fruits/vegetables and lean protein, and avoidance of unhealthy habits like smoking and excessive alcohol intake.   Also, counseled on importance of being compliant with  "medication, health appointments, diet and exercise.     Return in about 1 year (around 11/17/2018).mc    "This note will not be shared with the patient."  "

## 2017-11-20 ENCOUNTER — TELEPHONE (OUTPATIENT)
Dept: FAMILY MEDICINE | Facility: CLINIC | Age: 47
End: 2017-11-20

## 2017-11-20 NOTE — TELEPHONE ENCOUNTER
Pt would like to follow up on liver enzymes.  He was instructed to let you know if he'd rather [facility] than the [facility].  No availability.

## 2017-11-28 NOTE — TELEPHONE ENCOUNTER
Put into yellow 12:10 this Thursday Nov 30; if that doesn't work, schedule with Anastasiya now and me in 3 mo w labs before me

## 2017-11-30 ENCOUNTER — OFFICE VISIT (OUTPATIENT)
Dept: FAMILY MEDICINE | Facility: CLINIC | Age: 47
End: 2017-11-30
Payer: OTHER GOVERNMENT

## 2017-11-30 ENCOUNTER — PATIENT MESSAGE (OUTPATIENT)
Dept: FAMILY MEDICINE | Facility: CLINIC | Age: 47
End: 2017-11-30

## 2017-11-30 VITALS
HEART RATE: 49 BPM | WEIGHT: 183.19 LBS | HEIGHT: 66 IN | SYSTOLIC BLOOD PRESSURE: 114 MMHG | OXYGEN SATURATION: 97 % | BODY MASS INDEX: 29.44 KG/M2 | DIASTOLIC BLOOD PRESSURE: 84 MMHG | RESPIRATION RATE: 20 BRPM

## 2017-11-30 DIAGNOSIS — E78.1 HIGH TRIGLYCERIDES: ICD-10-CM

## 2017-11-30 DIAGNOSIS — E78.5 DYSLIPIDEMIA: Primary | ICD-10-CM

## 2017-11-30 DIAGNOSIS — N17.9 AKI (ACUTE KIDNEY INJURY): ICD-10-CM

## 2017-11-30 DIAGNOSIS — I10 ESSENTIAL HYPERTENSION: ICD-10-CM

## 2017-11-30 DIAGNOSIS — R74.01 ELEVATED ALT MEASUREMENT: ICD-10-CM

## 2017-11-30 PROCEDURE — 99214 OFFICE O/P EST MOD 30 MIN: CPT | Mod: S$PBB,,, | Performed by: INTERNAL MEDICINE

## 2017-11-30 PROCEDURE — 99213 OFFICE O/P EST LOW 20 MIN: CPT | Mod: PBBFAC,PO | Performed by: INTERNAL MEDICINE

## 2017-11-30 PROCEDURE — 99999 PR PBB SHADOW E&M-EST. PATIENT-LVL III: CPT | Mod: PBBFAC,,, | Performed by: INTERNAL MEDICINE

## 2017-11-30 RX ORDER — OMEGA-3-ACID ETHYL ESTERS 1 G/1
2 CAPSULE, LIQUID FILLED ORAL 2 TIMES DAILY
Qty: 120 CAPSULE | Refills: 6 | Status: SHIPPED | OUTPATIENT
Start: 2017-11-30 | End: 2018-01-30 | Stop reason: ALTCHOICE

## 2017-11-30 RX ORDER — METOPROLOL SUCCINATE 50 MG/1
50 TABLET, EXTENDED RELEASE ORAL DAILY
COMMUNITY

## 2017-11-30 NOTE — PATIENT INSTRUCTIONS
Eating less processed foods(crackers, potato chips, and cookies) and/or less foods containing high amounts of simple sugars(candy, doughnuts, cakes, milk chocolate or sweets ) will lower your triglyceride levels.  Also, eating foods high in omega 3 fatty acids(fatty fish such as salmon) will help to lower your triglyceride levels.

## 2017-11-30 NOTE — TELEPHONE ENCOUNTER
Reply sent to pt in regards to My Chart message. Adjustment made to medications as directed per pt. CLC

## 2017-11-30 NOTE — PROGRESS NOTES
Subjective:       Patient ID: Supa Bass is a 47 y.o. male.    Chief Complaint: Results    severly high triglycerides - [facility] showed trig in 300s.  Not on treatment.  Elevated ALT - LFT not done on 4/2017 labs.  Strong FH autoimmune diseases  - mom, sister, daughter  Elevated LDL - controlled on statin  HTN - controlled  ABEBE vs CKD III - GFR 41 4/17 labs; now 59.      Recall:   [facility]; will bring/send labs to us    RAUL on cpap - not working right and needs a referral  PTSD from war - controlled on Wellbutrin and Lunesta      Review of Systems   Constitutional: Negative for appetite change and fever.   HENT: Negative for nosebleeds and trouble swallowing.    Eyes: Negative for discharge and visual disturbance.   Respiratory: Negative for choking and shortness of breath.    Cardiovascular: Negative for chest pain and palpitations.   Gastrointestinal: Negative for abdominal pain, nausea and vomiting.   Musculoskeletal: Negative for arthralgias and joint swelling.   Skin: Negative for rash and wound.   Neurological: Negative for dizziness and syncope.   Psychiatric/Behavioral: Negative for confusion and dysphoric mood.       Objective:      Vitals:    11/30/17 1215   BP: 114/84   Pulse: (!) 49   Resp: 20     Physical Exam   Constitutional: He appears well-nourished.   Eyes: Conjunctivae and EOM are normal.   Neck: Normal range of motion.   Cardiovascular: Normal rate and regular rhythm.    Pulmonary/Chest: Effort normal and breath sounds normal.   Musculoskeletal:   Normal ROM bilateral    Neurological: No cranial nerve deficit (grossly intact).   Skin: Skin is warm and dry.   Psychiatric: He has a normal mood and affect.   Alert and orientated   Vitals reviewed.        Assessment:       1. Dyslipidemia    2. Essential hypertension    3. Elevated ALT measurement    4. ABEBE (acute kidney injury)    5.  High triglycerides        Plan:       Dyslipidemia  -     omega-3 acid ethyl esters (LOVAZA) 1 gram capsule; Take 2 capsules (2 g total) by mouth 2 (two) times daily.  Dispense: 120 capsule; Refill: 6  -     Lipid panel; Future; Expected date: 01/30/2018  -     Lipid panel; Future; Expected date: 05/29/2018    Essential hypertension  -     Comprehensive metabolic panel; Future; Expected date: 01/30/2018  -     Comprehensive metabolic panel; Future; Expected date: 05/29/2018    Elevated ALT measurement  -     Comprehensive metabolic panel; Future; Expected date: 01/30/2018  -     KIMMIE; Future; Expected date: 01/30/2018  -     Anti-smooth muscle antibody; Future; Expected date: 01/30/2018  -     Ferritin; Future; Expected date: 01/30/2018  -     Hepatitis panel, acute; Future; Expected date: 11/30/2017  -     Iron and TIBC; Future; Expected date: 01/30/2018  -     TSH; Future; Expected date: 01/30/2018  -     US Abdomen Limited; Future  -     Comprehensive metabolic panel; Future; Expected date: 05/29/2018    ABEBE (acute kidney injury)  -     Comprehensive metabolic panel; Future; Expected date: 01/30/2018  -     Comprehensive metabolic panel; Future; Expected date: 05/29/2018    High triglycerides  -     omega-3 acid ethyl esters (LOVAZA) 1 gram capsule; Take 2 capsules (2 g total) by mouth 2 (two) times daily.  Dispense: 120 capsule; Refill: 6  -     Lipid panel; Future; Expected date: 01/30/2018  -     Lipid panel; Future; Expected date: 05/29/2018    Avoiding Fenofibrate and Gemfibrozil 2nd to elevated ALT.  Need Lovaza.  ls changes  Full liver w/u - discuss with me if needed.  Mild, so will monitor if w/u neg.    If lft normalize, then consider Fenofibrate only for cost purposes.   Monitor kidney function.   Labs sent to scan.         Counseled on regular exercise, maintenance of a healthy weight, balanced diet rich in fruits/vegetables and lean protein, and avoidance of unhealthy habits like smoking and excessive  "alcohol intake.   Also, counseled on importance of being compliant with medication, health appointments, diet and exercise.     Return in about 6 months (around 5/30/2018).  NP 2 months    "This note will not be shared with the patient."  "

## 2017-12-01 ENCOUNTER — PATIENT MESSAGE (OUTPATIENT)
Dept: FAMILY MEDICINE | Facility: CLINIC | Age: 47
End: 2017-12-01

## 2017-12-06 ENCOUNTER — HOSPITAL ENCOUNTER (OUTPATIENT)
Dept: RADIOLOGY | Facility: HOSPITAL | Age: 47
Discharge: HOME | End: 2017-12-06
Attending: INTERNAL MEDICINE
Payer: OTHER GOVERNMENT

## 2017-12-06 DIAGNOSIS — R74.01 ELEVATED ALT MEASUREMENT: ICD-10-CM

## 2017-12-06 PROCEDURE — 76705 ECHO EXAM OF ABDOMEN: CPT | Mod: TC,PO

## 2017-12-06 PROCEDURE — 76705 ECHO EXAM OF ABDOMEN: CPT | Mod: 26,,, | Performed by: RADIOLOGY

## 2017-12-07 ENCOUNTER — TELEPHONE (OUTPATIENT)
Dept: FAMILY MEDICINE | Facility: CLINIC | Age: 47
End: 2017-12-07

## 2017-12-11 ENCOUNTER — TELEPHONE (OUTPATIENT)
Dept: FAMILY MEDICINE | Facility: CLINIC | Age: 47
End: 2017-12-11

## 2017-12-11 NOTE — TELEPHONE ENCOUNTER
PA denied, pt has not tried niacin or fibrate. Spoke with pt, confirmed that he has not tried above medications.   Notified Dr. Santiago in new phone encounter.

## 2017-12-11 NOTE — TELEPHONE ENCOUNTER
Dr. Santiago,    The PA for Lovaza was denied due to pt not having niacin OR fibrate therapy first.  Pt has been notified and is unable/not willing to purchase, cost $400.00.    Please advise

## 2017-12-12 RX ORDER — FENOFIBRATE 54 MG/1
54 TABLET ORAL DAILY
Qty: 30 TABLET | Refills: 6 | Status: SHIPPED | OUTPATIENT
Start: 2017-12-12 | End: 2018-05-29 | Stop reason: SDUPTHER

## 2017-12-12 NOTE — TELEPHONE ENCOUNTER
Fenofibrate sent in.  Anastasiya - if LFT increase more, then DC fenofibrate and resubmit for Lovaza.  Niacin does not lower triglycerides and at that point, pt could not tolerate fenofibrate.  May need to increase fenofibrate.

## 2018-01-10 ENCOUNTER — OFFICE VISIT (OUTPATIENT)
Dept: URGENT CARE | Facility: CLINIC | Age: 48
End: 2018-01-10
Payer: OTHER GOVERNMENT

## 2018-01-10 VITALS
SYSTOLIC BLOOD PRESSURE: 124 MMHG | OXYGEN SATURATION: 98 % | BODY MASS INDEX: 28.12 KG/M2 | HEIGHT: 66 IN | TEMPERATURE: 101 F | HEART RATE: 98 BPM | DIASTOLIC BLOOD PRESSURE: 93 MMHG | RESPIRATION RATE: 18 BRPM | WEIGHT: 175 LBS

## 2018-01-10 DIAGNOSIS — J10.1 INFLUENZA A: ICD-10-CM

## 2018-01-10 DIAGNOSIS — R50.9 FEVER, UNSPECIFIED FEVER CAUSE: Primary | ICD-10-CM

## 2018-01-10 LAB
CTP QC/QA: YES
FLUAV AG NPH QL: NEGATIVE
FLUBV AG NPH QL: POSITIVE

## 2018-01-10 PROCEDURE — 87804 INFLUENZA ASSAY W/OPTIC: CPT | Mod: QW,S$GLB,, | Performed by: EMERGENCY MEDICINE

## 2018-01-10 PROCEDURE — 99213 OFFICE O/P EST LOW 20 MIN: CPT | Mod: S$GLB,,, | Performed by: EMERGENCY MEDICINE

## 2018-01-10 RX ORDER — OSELTAMIVIR PHOSPHATE 75 MG/1
75 CAPSULE ORAL 2 TIMES DAILY
Qty: 10 CAPSULE | Refills: 0 | Status: SHIPPED | OUTPATIENT
Start: 2018-01-10 | End: 2018-01-15

## 2018-01-10 RX ORDER — BENZONATATE 200 MG/1
200 CAPSULE ORAL 3 TIMES DAILY PRN
Qty: 21 CAPSULE | Refills: 0 | Status: SHIPPED | OUTPATIENT
Start: 2018-01-10 | End: 2018-01-17

## 2018-01-10 NOTE — PROGRESS NOTES
"Subjective:       Patient ID: Supa Bass is a 47 y.o. male.    Vitals:  height is 5' 6" (1.676 m) and weight is 79.4 kg (175 lb). His oral temperature is 101.3 °F (38.5 °C) (abnormal). His blood pressure is 124/93 (abnormal) and his pulse is 98. His respiration is 18 and oxygen saturation is 98%.     Chief Complaint: Cough (Cough, congestion, chills, fatigue, weakness and diarrhea for 3 days)    Cough   This is a new problem. The current episode started in the past 7 days. The problem has been gradually worsening. The problem occurs every few minutes. The cough is productive of brown sputum and productive of purulent sputum. Associated symptoms include a fever, nasal congestion and postnasal drip. Pertinent negatives include no chest pain, chills, ear pain, eye redness, headaches, myalgias, sore throat, shortness of breath or wheezing. Nothing aggravates the symptoms. Risk factors for lung disease include animal exposure. He has tried OTC cough suppressant for the symptoms. The treatment provided mild relief.     Review of Systems   Constitution: Positive for fever. Negative for chills and malaise/fatigue.   HENT: Positive for postnasal drip. Negative for congestion, ear pain, hoarse voice and sore throat.    Eyes: Negative for discharge and redness.   Cardiovascular: Negative for chest pain, dyspnea on exertion and leg swelling.   Respiratory: Positive for cough. Negative for shortness of breath, sputum production and wheezing.    Musculoskeletal: Negative for myalgias.   Gastrointestinal: Negative for abdominal pain and nausea.   Neurological: Negative for headaches.       Objective:      Physical Exam   Constitutional: He is oriented to person, place, and time. He appears well-developed and well-nourished. He is cooperative.  Non-toxic appearance. He does not appear ill. No distress.   HENT:   Head: Normocephalic and atraumatic.   Right Ear: Hearing, tympanic membrane, external ear and ear canal normal. "   Left Ear: Hearing, tympanic membrane, external ear and ear canal normal.   Nose: Mucosal edema and rhinorrhea present. No nasal deformity. No epistaxis. Right sinus exhibits no maxillary sinus tenderness and no frontal sinus tenderness. Left sinus exhibits no maxillary sinus tenderness and no frontal sinus tenderness.   Mouth/Throat: Uvula is midline, oropharynx is clear and moist and mucous membranes are normal. No trismus in the jaw. Normal dentition. No uvula swelling. No posterior oropharyngeal erythema.   Eyes: Conjunctivae and lids are normal. No scleral icterus.   Sclera clear bilat   Neck: Trachea normal, full passive range of motion without pain and phonation normal.   Cardiovascular: Normal rate, regular rhythm, normal heart sounds and normal pulses.    Pulmonary/Chest: Effort normal and breath sounds normal. No respiratory distress.   Abdominal: Normal appearance.   Musculoskeletal: Normal range of motion. He exhibits no edema or deformity.   Neurological: He is alert and oriented to person, place, and time. He exhibits normal muscle tone. Coordination normal.   Skin: Skin is warm, dry and intact. He is not diaphoretic. No pallor.   Psychiatric: He has a normal mood and affect. His speech is normal and behavior is normal. Judgment and thought content normal. Cognition and memory are normal.   Nursing note and vitals reviewed.      Assessment:       1. Fever, unspecified fever cause    2. Influenza A        Plan:         Fever, unspecified fever cause  -     POCT Influenza A/B    Influenza A  -     oseltamivir (TAMIFLU) 75 MG capsule; Take 1 capsule (75 mg total) by mouth 2 (two) times daily.  Dispense: 10 capsule; Refill: 0  -     benzonatate (TESSALON) 200 MG capsule; Take 1 capsule (200 mg total) by mouth 3 (three) times daily as needed for Cough.  Dispense: 21 capsule; Refill: 0

## 2018-01-23 ENCOUNTER — LAB VISIT (OUTPATIENT)
Dept: LAB | Facility: HOSPITAL | Age: 48
End: 2018-01-23
Attending: INTERNAL MEDICINE
Payer: OTHER GOVERNMENT

## 2018-01-23 DIAGNOSIS — N17.9 AKI (ACUTE KIDNEY INJURY): ICD-10-CM

## 2018-01-23 DIAGNOSIS — R74.01 ELEVATED ALT MEASUREMENT: ICD-10-CM

## 2018-01-23 DIAGNOSIS — E78.1 HIGH TRIGLYCERIDES: ICD-10-CM

## 2018-01-23 DIAGNOSIS — I10 ESSENTIAL HYPERTENSION: ICD-10-CM

## 2018-01-23 DIAGNOSIS — E78.5 DYSLIPIDEMIA: ICD-10-CM

## 2018-01-23 LAB
ALBUMIN SERPL BCP-MCNC: 3.8 G/DL
ALP SERPL-CCNC: 79 U/L
ALT SERPL W/O P-5'-P-CCNC: 34 U/L
ANION GAP SERPL CALC-SCNC: 6 MMOL/L
AST SERPL-CCNC: 23 U/L
BILIRUB SERPL-MCNC: 0.9 MG/DL
BUN SERPL-MCNC: 18 MG/DL
CALCIUM SERPL-MCNC: 9.4 MG/DL
CHLORIDE SERPL-SCNC: 109 MMOL/L
CHOLEST SERPL-MCNC: 119 MG/DL
CHOLEST/HDLC SERPL: 3.7 {RATIO}
CO2 SERPL-SCNC: 28 MMOL/L
CREAT SERPL-MCNC: 1.2 MG/DL
EST. GFR  (AFRICAN AMERICAN): >60 ML/MIN/1.73 M^2
EST. GFR  (NON AFRICAN AMERICAN): >60 ML/MIN/1.73 M^2
FERRITIN SERPL-MCNC: 134 NG/ML
GLUCOSE SERPL-MCNC: 106 MG/DL
HDLC SERPL-MCNC: 32 MG/DL
HDLC SERPL: 26.9 %
IRON SERPL-MCNC: 92 UG/DL
LDLC SERPL CALC-MCNC: 52 MG/DL
NONHDLC SERPL-MCNC: 87 MG/DL
POTASSIUM SERPL-SCNC: 3.9 MMOL/L
PROT SERPL-MCNC: 6.9 G/DL
SATURATED IRON: 22 %
SODIUM SERPL-SCNC: 143 MMOL/L
TOTAL IRON BINDING CAPACITY: 423 UG/DL
TRANSFERRIN SERPL-MCNC: 286 MG/DL
TRIGL SERPL-MCNC: 175 MG/DL
TSH SERPL DL<=0.005 MIU/L-ACNC: 2.32 UIU/ML

## 2018-01-23 PROCEDURE — 84443 ASSAY THYROID STIM HORMONE: CPT

## 2018-01-23 PROCEDURE — 36415 COLL VENOUS BLD VENIPUNCTURE: CPT | Mod: PO

## 2018-01-23 PROCEDURE — 80053 COMPREHEN METABOLIC PANEL: CPT

## 2018-01-23 PROCEDURE — 80061 LIPID PANEL: CPT

## 2018-01-23 PROCEDURE — 83540 ASSAY OF IRON: CPT

## 2018-01-23 PROCEDURE — 86038 ANTINUCLEAR ANTIBODIES: CPT

## 2018-01-23 PROCEDURE — 86256 FLUORESCENT ANTIBODY TITER: CPT

## 2018-01-23 PROCEDURE — 82728 ASSAY OF FERRITIN: CPT

## 2018-01-23 PROCEDURE — 80074 ACUTE HEPATITIS PANEL: CPT

## 2018-01-24 LAB
ANA SER QL IF: NORMAL
HAV IGM SERPL QL IA: NEGATIVE
HBV CORE IGM SERPL QL IA: NEGATIVE
HBV SURFACE AG SERPL QL IA: NEGATIVE
HCV AB SERPL QL IA: NEGATIVE

## 2018-01-25 LAB — SMOOTH MUSCLE AB TITR SER IF: ABNORMAL {TITER}

## 2018-01-30 ENCOUNTER — OFFICE VISIT (OUTPATIENT)
Dept: FAMILY MEDICINE | Facility: CLINIC | Age: 48
End: 2018-01-30
Payer: OTHER GOVERNMENT

## 2018-01-30 VITALS
DIASTOLIC BLOOD PRESSURE: 84 MMHG | HEIGHT: 66 IN | RESPIRATION RATE: 16 BRPM | SYSTOLIC BLOOD PRESSURE: 112 MMHG | BODY MASS INDEX: 28.74 KG/M2 | TEMPERATURE: 98 F | OXYGEN SATURATION: 98 % | HEART RATE: 51 BPM | WEIGHT: 178.81 LBS

## 2018-01-30 DIAGNOSIS — I10 ESSENTIAL HYPERTENSION: ICD-10-CM

## 2018-01-30 DIAGNOSIS — E78.5 DYSLIPIDEMIA: Primary | ICD-10-CM

## 2018-01-30 DIAGNOSIS — E78.1 HYPERTRIGLYCERIDEMIA: ICD-10-CM

## 2018-01-30 PROCEDURE — 99214 OFFICE O/P EST MOD 30 MIN: CPT | Mod: PBBFAC,PO | Performed by: NURSE PRACTITIONER

## 2018-01-30 PROCEDURE — 99999 PR PBB SHADOW E&M-EST. PATIENT-LVL IV: CPT | Mod: PBBFAC,,, | Performed by: NURSE PRACTITIONER

## 2018-01-30 PROCEDURE — 99214 OFFICE O/P EST MOD 30 MIN: CPT | Mod: S$PBB,,, | Performed by: NURSE PRACTITIONER

## 2018-01-30 NOTE — PROGRESS NOTES
Subjective:       Patient ID: Supa Bass is a 47 y.o. male.    Chief Complaint: Dyslipidemia (f/u)    HPI   Mr. Bass is a new patient to me. He presents today for 2 month follow up. Started on Lovaza per PCP in November however switched to fenofibrate due to cost, lipitor continued, recent labs show great improvement, no effect on liver, previous ALT elevation now resolved, liver ultrasound without significant abnormality. He also reports he was sick with the flu 1 week before lab draw and did not take any meds for 1 week. Without complaints, denies side effects   Vitals:    01/30/18 0908   BP: 112/84   Pulse: (!) 51   Resp: 16   Temp: 98 °F (36.7 °C)     Review of Systems   Constitutional: Negative for fever and unexpected weight change.   HENT: Negative for hearing loss, rhinorrhea and trouble swallowing.    Eyes: Negative for discharge and visual disturbance.   Respiratory: Negative for chest tightness and wheezing.    Cardiovascular: Negative for chest pain and palpitations.   Gastrointestinal: Positive for constipation. Negative for blood in stool, diarrhea and vomiting.   Endocrine: Negative for polydipsia and polyuria.   Genitourinary: Negative for difficulty urinating, hematuria and urgency.   Musculoskeletal: Negative for arthralgias, joint swelling and neck pain.   Neurological: Negative for weakness and headaches.   Psychiatric/Behavioral: Negative for confusion and dysphoric mood.       Past Medical History:   Diagnosis Date    GERD (gastroesophageal reflux disease)     Hearing loss     HTN (hypertension)     Hyperlipidemia     RAUL (obstructive sleep apnea)     CPAP at night    PTSD (post-traumatic stress disorder)      Objective:      Physical Exam   Constitutional: He is oriented to person, place, and time. He does not have a sickly appearance. No distress.   HENT:   Head: Normocephalic.   Right Ear: Hearing normal.   Left Ear: Hearing normal.   Nose: Nose normal.   Eyes:  Conjunctivae and lids are normal.   Neck: No JVD present. No tracheal deviation present.   Cardiovascular: Normal rate, regular rhythm, S1 normal, S2 normal and normal heart sounds.    Pulmonary/Chest: Effort normal and breath sounds normal. He exhibits no tenderness.   Abdominal: Normal appearance. He exhibits no distension.   Musculoskeletal: Normal range of motion. He exhibits no edema or deformity.   Neurological: He is alert and oriented to person, place, and time.   Skin: He is not diaphoretic. No pallor.   Psychiatric: He has a normal mood and affect. His speech is normal and behavior is normal. Judgment and thought content normal. Cognition and memory are normal.   Nursing note and vitals reviewed.      Assessment:       1. Dyslipidemia    2. Hypertriglyceridemia    3. Essential hypertension        Plan:       Dyslipidemia  Hypertriglyceridemia   Continue medications as now    Essential hypertension   controlled    Complete fasting labs prior to appointment with PCP in May  Follow up with me as needed

## 2018-02-19 ENCOUNTER — OFFICE VISIT (OUTPATIENT)
Dept: FAMILY MEDICINE | Facility: CLINIC | Age: 48
End: 2018-02-19
Payer: OTHER GOVERNMENT

## 2018-02-19 VITALS
BODY MASS INDEX: 28.63 KG/M2 | DIASTOLIC BLOOD PRESSURE: 82 MMHG | HEART RATE: 57 BPM | TEMPERATURE: 98 F | WEIGHT: 178.13 LBS | OXYGEN SATURATION: 98 % | RESPIRATION RATE: 16 BRPM | HEIGHT: 66 IN | SYSTOLIC BLOOD PRESSURE: 114 MMHG

## 2018-02-19 DIAGNOSIS — I10 ESSENTIAL HYPERTENSION: ICD-10-CM

## 2018-02-19 DIAGNOSIS — J01.00 ACUTE NON-RECURRENT MAXILLARY SINUSITIS: Primary | ICD-10-CM

## 2018-02-19 PROCEDURE — 99999 PR PBB SHADOW E&M-EST. PATIENT-LVL III: CPT | Mod: PBBFAC,,, | Performed by: NURSE PRACTITIONER

## 2018-02-19 PROCEDURE — 99213 OFFICE O/P EST LOW 20 MIN: CPT | Mod: PBBFAC,PO | Performed by: NURSE PRACTITIONER

## 2018-02-19 PROCEDURE — 99214 OFFICE O/P EST MOD 30 MIN: CPT | Mod: S$PBB,,, | Performed by: NURSE PRACTITIONER

## 2018-02-19 PROCEDURE — 3008F BODY MASS INDEX DOCD: CPT | Mod: ,,, | Performed by: NURSE PRACTITIONER

## 2018-02-19 RX ORDER — METHYLPREDNISOLONE 4 MG/1
TABLET ORAL
Qty: 1 PACKAGE | Refills: 0 | Status: SHIPPED | OUTPATIENT
Start: 2018-02-19 | End: 2018-03-12

## 2018-02-19 RX ORDER — AMOXICILLIN AND CLAVULANATE POTASSIUM 875; 125 MG/1; MG/1
1 TABLET, FILM COATED ORAL 2 TIMES DAILY
Qty: 14 TABLET | Refills: 0 | Status: SHIPPED | OUTPATIENT
Start: 2018-02-19 | End: 2018-02-26

## 2018-02-19 RX ORDER — CYCLOBENZAPRINE HCL 10 MG
TABLET ORAL
Refills: 0 | COMMUNITY
Start: 2018-02-09 | End: 2018-11-29 | Stop reason: ALTCHOICE

## 2018-02-19 NOTE — PROGRESS NOTES
Subjective:       Patient ID: Supa Bass is a 47 y.o. male.    Chief Complaint: Nasal Congestion (bloody yellow drainage); Sinusitis; Ear Fullness; and Sore Throat    Mr. Bass is a known patient to me. He presents today for sinus problem    Sinusitis   This is a new problem. The current episode started 1 to 4 weeks ago. The problem is unchanged. There has been no fever. Associated symptoms include congestion, headaches and sinus pressure. Pertinent negatives include no neck pain. Past treatments include acetaminophen (flonase, neti pot). The treatment provided mild relief.     Vitals:    02/19/18 0948   BP: 114/82   Pulse: (!) 57   Resp: 16   Temp: 98.2 °F (36.8 °C)     Review of Systems   Constitutional: Negative for activity change and unexpected weight change.   HENT: Positive for congestion, rhinorrhea, sinus pain and sinus pressure. Negative for hearing loss and trouble swallowing.         +right sided dental pain   Eyes: Negative for discharge and visual disturbance.   Respiratory: Negative for chest tightness and wheezing.    Cardiovascular: Negative for chest pain and palpitations.   Gastrointestinal: Negative for blood in stool, constipation, diarrhea and vomiting.   Endocrine: Negative for polydipsia and polyuria.   Genitourinary: Negative for difficulty urinating, hematuria and urgency.   Musculoskeletal: Negative for arthralgias, joint swelling and neck pain.   Neurological: Positive for headaches. Negative for weakness.   Psychiatric/Behavioral: Negative for confusion and dysphoric mood.       Past Medical History:   Diagnosis Date    GERD (gastroesophageal reflux disease)     Hearing loss     HTN (hypertension)     Hyperlipidemia     RAUL (obstructive sleep apnea)     CPAP at night    PTSD (post-traumatic stress disorder)      Objective:      Physical Exam   Constitutional: He is oriented to person, place, and time. He does not have a sickly appearance. No distress.   HENT:   Head:  Normocephalic.   Right Ear: Hearing normal. Tympanic membrane is bulging.   Left Ear: Hearing normal. Tympanic membrane is bulging.   Nose: Right sinus exhibits maxillary sinus tenderness.   Mouth/Throat:       Eyes: Conjunctivae and lids are normal.   Neck: No JVD present. No tracheal deviation present.   Cardiovascular: Normal rate, regular rhythm, S1 normal, S2 normal and normal heart sounds.    Pulmonary/Chest: Effort normal and breath sounds normal. He exhibits no tenderness.   Abdominal: Normal appearance. He exhibits no distension.   Musculoskeletal: Normal range of motion. He exhibits no edema or deformity.   Neurological: He is alert and oriented to person, place, and time.   Skin: He is not diaphoretic. No pallor.   Psychiatric: He has a normal mood and affect. His speech is normal and behavior is normal. Judgment and thought content normal. Cognition and memory are normal.   Nursing note and vitals reviewed.      Assessment:       1. Acute non-recurrent maxillary sinusitis    2. Essential hypertension        Plan:       Acute non-recurrent maxillary sinusitis  -     amoxicillin-clavulanate 875-125mg (AUGMENTIN) 875-125 mg per tablet; Take 1 tablet by mouth 2 (two) times daily.  Dispense: 14 tablet; Refill: 0  -     methylPREDNISolone (MEDROL DOSEPACK) 4 mg tablet; use as directed  Dispense: 1 Package; Refill: 0  - Continue flonase, start antihistamine daily  - neti pot prn  - Educated on supportive care/symptom relief    Essential hypertension   controlled      Follow-up if symptoms worsen or fail to improve.

## 2018-03-01 ENCOUNTER — PATIENT MESSAGE (OUTPATIENT)
Dept: FAMILY MEDICINE | Facility: CLINIC | Age: 48
End: 2018-03-01

## 2018-03-06 ENCOUNTER — CLINICAL SUPPORT (OUTPATIENT)
Dept: FAMILY MEDICINE | Facility: CLINIC | Age: 48
End: 2018-03-06
Payer: OTHER GOVERNMENT

## 2018-03-06 VITALS — HEIGHT: 66 IN | WEIGHT: 167.13 LBS | BODY MASS INDEX: 26.86 KG/M2

## 2018-03-06 PROCEDURE — 99212 OFFICE O/P EST SF 10 MIN: CPT | Mod: PBBFAC,PO

## 2018-03-06 PROCEDURE — 99999 PR PBB SHADOW E&M-EST. PATIENT-LVL II: CPT | Mod: PBBFAC,,,

## 2018-03-06 NOTE — PROGRESS NOTES
Pt here to be weighed and height measured for letter to give to Marine Hernesto.  See communication letter. Signed by Anastasiya and given to pt.

## 2018-03-06 NOTE — LETTER
March 6, 2018    Supa Bass  [address] [place] [address]             [facility]  [address] [facility] [address] [place] [address]  Phone: [phone]  Fax: [phone] To whom it may concern,    Mr. Supa Bass was seen in clinic today. He is 5 foot 6.25 inches tall and weighs 167.1 pounds.    If you have any questions or concerns, please don't hesitate to call.    Sincerely,        EMILEE Uriostegui

## 2018-04-11 ENCOUNTER — HOSPITAL ENCOUNTER (OUTPATIENT)
Dept: RADIOLOGY | Facility: HOSPITAL | Age: 48
Discharge: HOME | End: 2018-04-11
Attending: NURSE PRACTITIONER
Payer: OTHER GOVERNMENT

## 2018-04-11 ENCOUNTER — OFFICE VISIT (OUTPATIENT)
Dept: FAMILY MEDICINE | Facility: CLINIC | Age: 48
End: 2018-04-11
Payer: OTHER GOVERNMENT

## 2018-04-11 VITALS
HEIGHT: 66 IN | DIASTOLIC BLOOD PRESSURE: 90 MMHG | HEART RATE: 60 BPM | SYSTOLIC BLOOD PRESSURE: 142 MMHG | OXYGEN SATURATION: 98 % | TEMPERATURE: 98 F | WEIGHT: 177.69 LBS | BODY MASS INDEX: 28.56 KG/M2

## 2018-04-11 DIAGNOSIS — M79.671 RIGHT FOOT PAIN: ICD-10-CM

## 2018-04-11 DIAGNOSIS — T14.90XA INJURY: ICD-10-CM

## 2018-04-11 DIAGNOSIS — T14.90XA INJURY: Primary | ICD-10-CM

## 2018-04-11 PROCEDURE — 99213 OFFICE O/P EST LOW 20 MIN: CPT | Mod: S$PBB,,, | Performed by: NURSE PRACTITIONER

## 2018-04-11 PROCEDURE — 99999 PR PBB SHADOW E&M-EST. PATIENT-LVL IV: CPT | Mod: PBBFAC,,, | Performed by: NURSE PRACTITIONER

## 2018-04-11 PROCEDURE — 73630 X-RAY EXAM OF FOOT: CPT | Mod: 26,RT,, | Performed by: RADIOLOGY

## 2018-04-11 PROCEDURE — 99214 OFFICE O/P EST MOD 30 MIN: CPT | Mod: PBBFAC,25,PO | Performed by: NURSE PRACTITIONER

## 2018-04-11 PROCEDURE — 73630 X-RAY EXAM OF FOOT: CPT | Mod: TC,FY,PO,RT

## 2018-04-11 RX ORDER — MELOXICAM 7.5 MG/1
7.5 TABLET ORAL DAILY
Qty: 14 TABLET | Refills: 0 | Status: SHIPPED | OUTPATIENT
Start: 2018-04-11 | End: 2018-04-25

## 2018-04-12 ENCOUNTER — TELEPHONE (OUTPATIENT)
Dept: FAMILY MEDICINE | Facility: CLINIC | Age: 48
End: 2018-04-12

## 2018-04-12 DIAGNOSIS — M79.671 RIGHT FOOT PAIN: Primary | ICD-10-CM

## 2018-04-12 NOTE — TELEPHONE ENCOUNTER
"Spoke with pt on the telephone. Notified of xray results:    Xray right foot 4/12/2018:  "Old fracture of the midshaft of the right 2nd metatarsal.  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   Referral to Podiatry Md for further evaluation and treatment.   Pt verbalized understanding.   "

## 2018-04-13 ENCOUNTER — OFFICE VISIT (OUTPATIENT)
Dept: PODIATRY | Facility: CLINIC | Age: 48
End: 2018-04-13
Payer: OTHER GOVERNMENT

## 2018-04-13 ENCOUNTER — TELEPHONE (OUTPATIENT)
Dept: FAMILY MEDICINE | Facility: CLINIC | Age: 48
End: 2018-04-13

## 2018-04-13 VITALS — HEIGHT: 66 IN | BODY MASS INDEX: 28.56 KG/M2 | WEIGHT: 177.69 LBS

## 2018-04-13 DIAGNOSIS — M20.5X1 ACQUIRED HALLUX LIMITUS OF RIGHT FOOT: Primary | ICD-10-CM

## 2018-04-13 PROCEDURE — 99999 PR PBB SHADOW E&M-EST. PATIENT-LVL III: CPT | Mod: PBBFAC,,, | Performed by: PODIATRIST

## 2018-04-13 PROCEDURE — 99203 OFFICE O/P NEW LOW 30 MIN: CPT | Mod: S$PBB,,, | Performed by: PODIATRIST

## 2018-04-13 PROCEDURE — 99213 OFFICE O/P EST LOW 20 MIN: CPT | Mod: PBBFAC,PN | Performed by: PODIATRIST

## 2018-04-13 NOTE — LETTER
April 13, 2018      Reina Gómez NP  [address] [facility] [address] [place] [address]           [facility]  [address] [facility] [address] [place] [address]  Phone: [phone]          Patient: Supa Bass   MR Number: [id number]   YOB: 1970   Date of Visit: 4/13/2018       Dear Reina Gómez:    Thank you for referring Supa Bass to me for evaluation. Attached you will find relevant portions of my assessment and plan of care.    If you have questions, please do not hesitate to call me. I look forward to following Supa Bass along with you.    Sincerely,    Mamadou Villarreal, SESAR    Enclosure  CC:  No Recipients    If you would like to receive this communication electronically, please contact [email or web address] or [phone] to request more information on [facility] access.    For providers and/or their staff who would like to refer a patient to [facility], please contact us through our one-stop-shop provider referral line, [facility], at [phone].    If you feel you have received this communication in error or would no longer like to receive these types of communications, please e-mail [email or web address]

## 2018-04-13 NOTE — TELEPHONE ENCOUNTER
----- Message from Mariella Flores sent at 4/13/2018 10:38 AM CDT -----  Contact: Patient  Patient is calling to schedule a history and physical for surgery clearance.  I am unable to schedule anything until 5/30 and the patient is having surgery on 5/4.  Please call patient to schedule.  Call Back#[phone]  Thanks
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
## 2018-04-20 ENCOUNTER — LAB VISIT (OUTPATIENT)
Dept: LAB | Facility: HOSPITAL | Age: 48
End: 2018-04-20
Attending: FAMILY MEDICINE
Payer: OTHER GOVERNMENT

## 2018-04-20 ENCOUNTER — OFFICE VISIT (OUTPATIENT)
Dept: FAMILY MEDICINE | Facility: CLINIC | Age: 48
End: 2018-04-20
Payer: OTHER GOVERNMENT

## 2018-04-20 VITALS
OXYGEN SATURATION: 96 % | BODY MASS INDEX: 28.63 KG/M2 | TEMPERATURE: 98 F | WEIGHT: 178.13 LBS | HEIGHT: 66 IN | SYSTOLIC BLOOD PRESSURE: 116 MMHG | DIASTOLIC BLOOD PRESSURE: 82 MMHG | RESPIRATION RATE: 15 BRPM | HEART RATE: 53 BPM

## 2018-04-20 DIAGNOSIS — I10 ESSENTIAL HYPERTENSION: ICD-10-CM

## 2018-04-20 DIAGNOSIS — M20.5X1 ACQUIRED HALLUX LIMITUS OF RIGHT FOOT: ICD-10-CM

## 2018-04-20 DIAGNOSIS — Z01.818 PREOP EXAMINATION: ICD-10-CM

## 2018-04-20 DIAGNOSIS — Z01.818 PREOP EXAMINATION: Primary | ICD-10-CM

## 2018-04-20 LAB
ANION GAP SERPL CALC-SCNC: 6 MMOL/L
APTT BLDCRRT: 28.2 SEC
BASOPHILS # BLD AUTO: 0.04 K/UL
BASOPHILS NFR BLD: 0.5 %
BUN SERPL-MCNC: 16 MG/DL
CALCIUM SERPL-MCNC: 9.8 MG/DL
CHLORIDE SERPL-SCNC: 107 MMOL/L
CO2 SERPL-SCNC: 30 MMOL/L
CREAT SERPL-MCNC: 1.3 MG/DL
DIFFERENTIAL METHOD: ABNORMAL
EOSINOPHIL # BLD AUTO: 0 K/UL
EOSINOPHIL NFR BLD: 0.4 %
ERYTHROCYTE [DISTWIDTH] IN BLOOD BY AUTOMATED COUNT: 12.3 %
EST. GFR  (AFRICAN AMERICAN): >60 ML/MIN/1.73 M^2
EST. GFR  (NON AFRICAN AMERICAN): >60 ML/MIN/1.73 M^2
GLUCOSE SERPL-MCNC: 99 MG/DL
HCT VFR BLD AUTO: 46.7 %
HGB BLD-MCNC: 16 G/DL
IMM GRANULOCYTES # BLD AUTO: 0.02 K/UL
IMM GRANULOCYTES NFR BLD AUTO: 0.3 %
LYMPHOCYTES # BLD AUTO: 2.3 K/UL
LYMPHOCYTES NFR BLD: 30.7 %
MCH RBC QN AUTO: 32.3 PG
MCHC RBC AUTO-ENTMCNC: 34.3 G/DL
MCV RBC AUTO: 94 FL
MONOCYTES # BLD AUTO: 0.9 K/UL
MONOCYTES NFR BLD: 12.5 %
NEUTROPHILS # BLD AUTO: 4.1 K/UL
NEUTROPHILS NFR BLD: 55.6 %
NRBC BLD-RTO: 0 /100 WBC
PLATELET # BLD AUTO: 233 K/UL
PMV BLD AUTO: 10.4 FL
POTASSIUM SERPL-SCNC: 4.6 MMOL/L
RBC # BLD AUTO: 4.95 M/UL
SODIUM SERPL-SCNC: 143 MMOL/L
WBC # BLD AUTO: 7.35 K/UL

## 2018-04-20 PROCEDURE — 80048 BASIC METABOLIC PNL TOTAL CA: CPT

## 2018-04-20 PROCEDURE — 99243 OFF/OP CNSLTJ NEW/EST LOW 30: CPT | Mod: S$PBB,,, | Performed by: FAMILY MEDICINE

## 2018-04-20 PROCEDURE — 93005 ELECTROCARDIOGRAM TRACING: CPT | Mod: PBBFAC,PO | Performed by: FAMILY MEDICINE

## 2018-04-20 PROCEDURE — 36415 COLL VENOUS BLD VENIPUNCTURE: CPT | Mod: PO

## 2018-04-20 PROCEDURE — 99213 OFFICE O/P EST LOW 20 MIN: CPT | Mod: PBBFAC,PO | Performed by: FAMILY MEDICINE

## 2018-04-20 PROCEDURE — 85730 THROMBOPLASTIN TIME PARTIAL: CPT | Mod: PO

## 2018-04-20 PROCEDURE — 85025 COMPLETE CBC W/AUTO DIFF WBC: CPT

## 2018-04-20 PROCEDURE — 93010 ELECTROCARDIOGRAM REPORT: CPT | Mod: ,,, | Performed by: INTERNAL MEDICINE

## 2018-04-20 PROCEDURE — 99999 PR PBB SHADOW E&M-EST. PATIENT-LVL III: CPT | Mod: PBBFAC,,, | Performed by: FAMILY MEDICINE

## 2018-04-20 NOTE — PROGRESS NOTES
Subjective:       Patient ID: Supa Bass is a 47 y.o. male.    Chief Complaint: Pre-op Exam (right great toe surgery)    HPI     Referred by Dr. Mamadou Pradhan, podiatrist, for a preop exam prior to right 1st metatarsal phlangeal joint fusion on 5/4/18.      htn controlled.         Review of Systems      Review of Systems   Constitutional: Negative for fever and chills.   HENT: Negative for hearing loss and neck stiffness.    Eyes: Negative for redness and itching.   Respiratory: Negative for cough and choking.    Cardiovascular: Negative for chest pain and leg swelling.  Abdomen: Negative for abdominal pain and blood in stool.   Genitourinary: Negative for dysuria and flank pain.   Musculoskeletal: Negative for back pain and gait problem.   Neurological: Negative for light-headedness and headaches.   Hematological: Negative for adenopathy.   Psychiatric/Behavioral: Negative for behavioral problems.       Objective:      Physical Exam   HENT:   Head: Atraumatic.   Eyes: Conjunctivae are normal. Pupils are equal, round, and reactive to light.   Neck: Normal range of motion.   Cardiovascular: Normal rate and regular rhythm.    No murmur heard.  Pulmonary/Chest: Effort normal and breath sounds normal. He has no wheezes.   Lymphadenopathy:     He has no cervical adenopathy.       Assessment:       1. Preop examination    2. Acquired hallux limitus of right foot    3. Essential hypertension        Plan:       Preop examination  -     APTT; Future; Expected date: 04/20/2018  -     Basic metabolic panel; Future; Expected date: 04/20/2018  -     CBC auto differential; Future; Expected date: 04/20/2018  -     EKG 12-lead; Future    Acquired hallux limitus of right foot    Essential hypertension          Plan:  ekg-sinus bradycardia 48 bpm  Labs today.    Pending results, will notify surgeon re: clearance    ADDENDUM:    Labs are wnl.    This patient is medically cleared for surgery.      Thank you for referring  this patient to me and allowing me to share his care.

## 2018-05-03 ENCOUNTER — ANESTHESIA EVENT (OUTPATIENT)
Dept: SURGERY | Facility: HOSPITAL | Age: 48
End: 2018-05-03
Payer: OTHER GOVERNMENT

## 2018-05-04 ENCOUNTER — HOSPITAL ENCOUNTER (OUTPATIENT)
Facility: HOSPITAL | Age: 48
Discharge: HOME | End: 2018-05-04
Attending: PODIATRIST | Admitting: PODIATRIST
Payer: OTHER GOVERNMENT

## 2018-05-04 ENCOUNTER — HOSPITAL ENCOUNTER (OUTPATIENT)
Dept: RADIOLOGY | Facility: HOSPITAL | Age: 48
Discharge: HOME | End: 2018-05-04
Attending: PODIATRIST | Admitting: PODIATRIST
Payer: OTHER GOVERNMENT

## 2018-05-04 ENCOUNTER — ANESTHESIA (OUTPATIENT)
Dept: SURGERY | Facility: HOSPITAL | Age: 48
End: 2018-05-04
Payer: OTHER GOVERNMENT

## 2018-05-04 ENCOUNTER — SURGERY (OUTPATIENT)
Age: 48
End: 2018-05-04

## 2018-05-04 DIAGNOSIS — M20.5X1 ACQUIRED HALLUX LIMITUS OF RIGHT FOOT: Primary | ICD-10-CM

## 2018-05-04 PROCEDURE — C1769 GUIDE WIRE: HCPCS | Mod: PO | Performed by: PODIATRIST

## 2018-05-04 PROCEDURE — 36000708 HC OR TIME LEV III 1ST 15 MIN: Mod: PO | Performed by: PODIATRIST

## 2018-05-04 PROCEDURE — 71000033 HC RECOVERY, INTIAL HOUR: Mod: PO | Performed by: PODIATRIST

## 2018-05-04 PROCEDURE — S0020 INJECTION, BUPIVICAINE HYDRO: HCPCS | Mod: PO | Performed by: PODIATRIST

## 2018-05-04 PROCEDURE — 37000009 HC ANESTHESIA EA ADD 15 MINS: Mod: PO | Performed by: PODIATRIST

## 2018-05-04 PROCEDURE — 63600175 PHARM REV CODE 636 W HCPCS: Mod: PO | Performed by: PODIATRIST

## 2018-05-04 PROCEDURE — 63600175 PHARM REV CODE 636 W HCPCS: Mod: PO | Performed by: ANESTHESIOLOGY

## 2018-05-04 PROCEDURE — 25000003 PHARM REV CODE 250: Mod: PO | Performed by: NURSE ANESTHETIST, CERTIFIED REGISTERED

## 2018-05-04 PROCEDURE — 28750 FUSION OF BIG TOE JOINT: CPT | Mod: T5,,, | Performed by: PODIATRIST

## 2018-05-04 PROCEDURE — 73630 X-RAY EXAM OF FOOT: CPT | Mod: TC,FY,PO,RT

## 2018-05-04 PROCEDURE — 27201423 OPTIME MED/SURG SUP & DEVICES STERILE SUPPLY: Mod: PO | Performed by: PODIATRIST

## 2018-05-04 PROCEDURE — D9220A PRA ANESTHESIA: Mod: CRNA,,, | Performed by: NURSE ANESTHETIST, CERTIFIED REGISTERED

## 2018-05-04 PROCEDURE — 73630 X-RAY EXAM OF FOOT: CPT | Mod: 26,RT,, | Performed by: RADIOLOGY

## 2018-05-04 PROCEDURE — C1713 ANCHOR/SCREW BN/BN,TIS/BN: HCPCS | Mod: PO | Performed by: PODIATRIST

## 2018-05-04 PROCEDURE — 37000008 HC ANESTHESIA 1ST 15 MINUTES: Mod: PO | Performed by: PODIATRIST

## 2018-05-04 PROCEDURE — D9220A PRA ANESTHESIA: Mod: ANES,,, | Performed by: ANESTHESIOLOGY

## 2018-05-04 PROCEDURE — 25000003 PHARM REV CODE 250: Mod: PO | Performed by: PODIATRIST

## 2018-05-04 PROCEDURE — 27800903 OPTIME MED/SURG SUP & DEVICES OTHER IMPLANTS: Mod: PO | Performed by: PODIATRIST

## 2018-05-04 PROCEDURE — 01480 ANES OPEN PX LOWER L/A/F NOS: CPT | Mod: PO | Performed by: PODIATRIST

## 2018-05-04 PROCEDURE — 36000709 HC OR TIME LEV III EA ADD 15 MIN: Mod: PO | Performed by: PODIATRIST

## 2018-05-04 PROCEDURE — 63600175 PHARM REV CODE 636 W HCPCS: Mod: PO | Performed by: NURSE ANESTHETIST, CERTIFIED REGISTERED

## 2018-05-04 DEVICE — SCREW LP VA 3X14MM LOK TI: Type: IMPLANTABLE DEVICE | Site: TOE | Status: FUNCTIONAL

## 2018-05-04 DEVICE — PUTTY DBX 1CC: Type: IMPLANTABLE DEVICE | Site: TOE | Status: FUNCTIONAL

## 2018-05-04 DEVICE — SCREW COMPRESSION FT 3.5X24MM: Type: IMPLANTABLE DEVICE | Site: TOE | Status: FUNCTIONAL

## 2018-05-04 DEVICE — SCREW LP VA 3X16MM LOK TI: Type: IMPLANTABLE DEVICE | Site: TOE | Status: FUNCTIONAL

## 2018-05-04 DEVICE — IMPLANTABLE DEVICE: Type: IMPLANTABLE DEVICE | Site: TOE | Status: FUNCTIONAL

## 2018-05-04 DEVICE — SCREW BONE VAL 3X18MM TI: Type: IMPLANTABLE DEVICE | Site: TOE | Status: FUNCTIONAL

## 2018-05-04 RX ORDER — LIDOCAINE HYDROCHLORIDE 10 MG/ML
INJECTION, SOLUTION EPIDURAL; INFILTRATION; INTRACAUDAL; PERINEURAL
Status: DISCONTINUED | OUTPATIENT
Start: 2018-05-04 | End: 2018-05-04 | Stop reason: HOSPADM

## 2018-05-04 RX ORDER — DEXAMETHASONE SODIUM PHOSPHATE 4 MG/ML
8 INJECTION, SOLUTION INTRA-ARTICULAR; INTRALESIONAL; INTRAMUSCULAR; INTRAVENOUS; SOFT TISSUE
Status: COMPLETED | OUTPATIENT
Start: 2018-05-04 | End: 2018-05-04

## 2018-05-04 RX ORDER — SODIUM CITRATE AND CITRIC ACID MONOHYDRATE 334; 500 MG/5ML; MG/5ML
SOLUTION ORAL
Status: DISCONTINUED | OUTPATIENT
Start: 2018-05-04 | End: 2018-05-04

## 2018-05-04 RX ORDER — CEFAZOLIN SODIUM 1 G/3ML
2 INJECTION, POWDER, FOR SOLUTION INTRAMUSCULAR; INTRAVENOUS ONCE
Status: COMPLETED | OUTPATIENT
Start: 2018-05-04 | End: 2018-05-04

## 2018-05-04 RX ORDER — OXYCODONE AND ACETAMINOPHEN 5; 325 MG/1; MG/1
1 TABLET ORAL EVERY 4 HOURS PRN
Status: DISCONTINUED | OUTPATIENT
Start: 2018-05-04 | End: 2018-05-04 | Stop reason: HOSPADM

## 2018-05-04 RX ORDER — OXYCODONE AND ACETAMINOPHEN 5; 325 MG/1; MG/1
1 TABLET ORAL EVERY 4 HOURS PRN
Qty: 40 TABLET | Refills: 0 | Status: SHIPPED | OUTPATIENT
Start: 2018-05-04 | End: 2018-05-29 | Stop reason: ALTCHOICE

## 2018-05-04 RX ORDER — PROPOFOL 10 MG/ML
VIAL (ML) INTRAVENOUS CONTINUOUS PRN
Status: DISCONTINUED | OUTPATIENT
Start: 2018-05-04 | End: 2018-05-04

## 2018-05-04 RX ORDER — MIDAZOLAM HYDROCHLORIDE 1 MG/ML
INJECTION, SOLUTION INTRAMUSCULAR; INTRAVENOUS
Status: DISCONTINUED | OUTPATIENT
Start: 2018-05-04 | End: 2018-05-04

## 2018-05-04 RX ORDER — SODIUM CHLORIDE, SODIUM LACTATE, POTASSIUM CHLORIDE, CALCIUM CHLORIDE 600; 310; 30; 20 MG/100ML; MG/100ML; MG/100ML; MG/100ML
INJECTION, SOLUTION INTRAVENOUS CONTINUOUS
Status: DISCONTINUED | OUTPATIENT
Start: 2018-05-04 | End: 2018-05-04 | Stop reason: HOSPADM

## 2018-05-04 RX ORDER — PROPOFOL 10 MG/ML
VIAL (ML) INTRAVENOUS
Status: DISCONTINUED | OUTPATIENT
Start: 2018-05-04 | End: 2018-05-04

## 2018-05-04 RX ORDER — LIDOCAINE HYDROCHLORIDE 10 MG/ML
0.5 INJECTION, SOLUTION EPIDURAL; INFILTRATION; INTRACAUDAL; PERINEURAL ONCE AS NEEDED
Status: DISCONTINUED | OUTPATIENT
Start: 2018-05-04 | End: 2018-05-04 | Stop reason: HOSPADM

## 2018-05-04 RX ORDER — FENTANYL CITRATE 50 UG/ML
25 INJECTION, SOLUTION INTRAMUSCULAR; INTRAVENOUS EVERY 5 MIN PRN
Status: DISCONTINUED | OUTPATIENT
Start: 2018-05-04 | End: 2018-05-04 | Stop reason: HOSPADM

## 2018-05-04 RX ORDER — ONDANSETRON 2 MG/ML
INJECTION INTRAMUSCULAR; INTRAVENOUS
Status: DISCONTINUED | OUTPATIENT
Start: 2018-05-04 | End: 2018-05-04

## 2018-05-04 RX ORDER — ACETAMINOPHEN 10 MG/ML
INJECTION, SOLUTION INTRAVENOUS
Status: DISCONTINUED | OUTPATIENT
Start: 2018-05-04 | End: 2018-05-04

## 2018-05-04 RX ORDER — SODIUM CHLORIDE 0.9 % (FLUSH) 0.9 %
3 SYRINGE (ML) INJECTION
Status: DISCONTINUED | OUTPATIENT
Start: 2018-05-04 | End: 2018-05-04 | Stop reason: HOSPADM

## 2018-05-04 RX ORDER — BUPIVACAINE HYDROCHLORIDE 5 MG/ML
INJECTION, SOLUTION EPIDURAL; INTRACAUDAL
Status: DISCONTINUED | OUTPATIENT
Start: 2018-05-04 | End: 2018-05-04 | Stop reason: HOSPADM

## 2018-05-04 RX ORDER — OXYCODONE HYDROCHLORIDE 5 MG/1
5 TABLET ORAL ONCE AS NEEDED
Status: DISCONTINUED | OUTPATIENT
Start: 2018-05-04 | End: 2018-05-04 | Stop reason: HOSPADM

## 2018-05-04 RX ORDER — FENTANYL CITRATE 50 UG/ML
INJECTION, SOLUTION INTRAMUSCULAR; INTRAVENOUS
Status: DISCONTINUED | OUTPATIENT
Start: 2018-05-04 | End: 2018-05-04

## 2018-05-04 RX ORDER — LIDOCAINE HCL/PF 100 MG/5ML
SYRINGE (ML) INTRAVENOUS
Status: DISCONTINUED | OUTPATIENT
Start: 2018-05-04 | End: 2018-05-04

## 2018-05-04 RX ADMIN — MIDAZOLAM HYDROCHLORIDE 2 MG: 1 INJECTION, SOLUTION INTRAMUSCULAR; INTRAVENOUS at 06:05

## 2018-05-04 RX ADMIN — LIDOCAINE HYDROCHLORIDE 100 MG: 20 INJECTION PARENTERAL at 07:05

## 2018-05-04 RX ADMIN — FENTANYL CITRATE 50 MCG: 50 INJECTION, SOLUTION INTRAMUSCULAR; INTRAVENOUS at 08:05

## 2018-05-04 RX ADMIN — ACETAMINOPHEN 1000 MG: 10 INJECTION, SOLUTION INTRAVENOUS at 07:05

## 2018-05-04 RX ADMIN — PROPOFOL 50 MCG/KG/MIN: 10 INJECTION, EMULSION INTRAVENOUS at 07:05

## 2018-05-04 RX ADMIN — CEFAZOLIN 2 G: 1 INJECTION, POWDER, FOR SOLUTION INTRAVENOUS at 07:05

## 2018-05-04 RX ADMIN — SODIUM CHLORIDE, SODIUM LACTATE, POTASSIUM CHLORIDE, CALCIUM CHLORIDE: 600; 310; 30; 20 INJECTION, SOLUTION INTRAVENOUS at 06:05

## 2018-05-04 RX ADMIN — PROPOFOL 50 MG: 10 INJECTION, EMULSION INTRAVENOUS at 07:05

## 2018-05-04 RX ADMIN — FENTANYL CITRATE 50 MCG: 50 INJECTION, SOLUTION INTRAMUSCULAR; INTRAVENOUS at 06:05

## 2018-05-04 RX ADMIN — ONDANSETRON 4 MG: 2 INJECTION, SOLUTION INTRAMUSCULAR; INTRAVENOUS at 08:05

## 2018-05-04 RX ADMIN — BUPIVACAINE HYDROCHLORIDE 10 ML: 5 INJECTION, SOLUTION EPIDURAL; INTRACAUDAL; PERINEURAL at 07:05

## 2018-05-04 RX ADMIN — SODIUM CITRATE AND CITRIC ACID MONOHYDRATE 30 ML: 500; 334 SOLUTION ORAL at 06:05

## 2018-05-04 RX ADMIN — LIDOCAINE HYDROCHLORIDE 10 ML: 10 INJECTION, SOLUTION EPIDURAL; INFILTRATION; INTRACAUDAL; PERINEURAL at 07:05

## 2018-05-04 RX ADMIN — DEXAMETHASONE SODIUM PHOSPHATE 8 MG: 4 INJECTION, SOLUTION INTRAMUSCULAR; INTRAVENOUS at 06:05

## 2018-05-04 NOTE — BRIEF OP NOTE
[facility]  Brief Operative Note     SUMMARY     Surgery Date: 5/4/2018     Surgeon(s) and Role:     * Mamadou Villarreal DPM - Primary    Assisting Surgeon: None    Pre-op Diagnosis:  Acquired hallux limitus of right foot [M20.5X1]    Post-op Diagnosis:  Post-Op Diagnosis Codes:     * Acquired hallux limitus of right foot [M20.5X1]    Procedure(s) (LRB):  FUSION- METATARSAL PHALANGEAL JOINT (MPJ) (Right)    Anesthesia: Local MAC    Description of the findings of the procedure: Right first MTPJ noted to have no <10 cartilage left within the joint there was a fibrous fusion with the toe plantar flexed position. Toe was placed in proper position and fused with compression screw and locking plate.    Findings/Key Components: as noted above    Estimated Blood Loss: <10 mL         Specimens:   Specimen (12h ago through future)    None          Discharge Note    SUMMARY     Admit Date: 5/4/2018    Discharge Date and Time:  05/04/2018 9:22 AM    Hospital Course (synopsis of major diagnoses, care, treatment, and services provided during the course of the hospital stay): Patient was admitted for an outpatient procedure and tolerated the procedure well with no complications.       Final Diagnosis: Post-Op Diagnosis Codes:     * Acquired hallux limitus of right foot [M20.5X1]    Disposition: Home or Self Care    Follow Up/Patient Instructions:     Medications:  Reconciled Home Medications:      Medication List      START taking these medications    oxyCODONE-acetaminophen 5-325 mg per tablet  Commonly known as:  PERCOCET  Take 1 tablet by mouth every 4 (four) hours as needed for Pain.        CONTINUE taking these medications    aspirin 81 MG EC tablet  Commonly known as:  ECOTRIN  Take 81 mg by mouth once daily.     atorvastatin 40 MG tablet  Commonly known as:  LIPITOR  Take 40 mg by mouth once daily.     buPROPion 150 MG TBSR 12 hr tablet  Commonly known as:  WELLBUTRIN SR  Take 150 mg by mouth 2 (two)  times daily.     cyclobenzaprine 10 MG tablet  Commonly known as:  FLEXERIL  TK ONE T PO Q 12 H PRN     esomeprazole 40 MG capsule  Commonly known as:  NEXIUM  Take 40 mg by mouth once daily.     eszopiclone 2 MG Tab  Commonly known as:  LUNESTA  Take 2 mg by mouth nightly.     fenofibrate 54 MG tablet  Commonly known as:  TRICOR  Take 1 tablet (54 mg total) by mouth once daily.     losartan 100 MG tablet  Commonly known as:  COZAAR  Take 100 mg by mouth once daily.     metoprolol succinate 50 MG 24 hr tablet  Commonly known as:  TOPROL-XL  Take 50 mg by mouth once daily.            Discharge Procedure Orders  Diet general     Keep surgical extremity elevated     Ice to affected area   Order Comments: Behind the right knee up to 15 minutes at a time     Weight bearing restrictions (specify)   Order Comments: Non weight bearing right foot     Call MD for:  temperature >100.4     Call MD for:  persistent nausea and vomiting     Call MD for:  severe uncontrolled pain     Call MD for:  difficulty breathing, headache or visual disturbances     Call MD for:  redness, tenderness, or signs of infection (pain, swelling, redness, odor or green/yellow discharge around incision site)     Call MD for:  hives     Call MD for:  persistent dizziness or light-headedness     Call MD for:  extreme fatigue     Leave dressing on - Keep it clean, dry, and intact until clinic visit       Follow-up Information     Mamadou Villarreal DPM In 1 week.    Specialty:  Podiatry  Contact information:  [address] [place] [address]  [phone]                 Mamadou Villarreal DPM

## 2018-05-04 NOTE — ANESTHESIA PREPROCEDURE EVALUATION
05/04/2018  Supa Bass is a 47 y.o., male.    Anesthesia Evaluation      I have reviewed the Medications.     Review of Systems  Anesthesia Hx:  No problems with previous Anesthesia   Social:  Non-Smoker, No Alcohol Use    Cardiovascular:   Hypertension hyperlipidemia    Pulmonary:   Sleep Apnea    Renal/:  Renal/ Normal     Hepatic/GI:   GERD    Neurological:  Neurology Normal    Endocrine:  Endocrine Normal    Psych:   Psychiatric History (PTSD)          Physical Exam  General:  Well nourished    Airway/Jaw/Neck:  Airway Findings: Mouth Opening: Normal Tongue: Normal  General Airway Assessment: Adult, Average  Mallampati: II  Jaw/Neck Findings:  Neck ROM: Normal ROM       Chest/Lungs:  Chest/Lungs Findings: Clear to auscultation, Normal Respiratory Rate     Heart/Vascular:  Heart Findings: Rate: Normal  Rhythm: Regular Rhythm  Sounds: Normal  Heart murmur: negative       Mental Status:  Mental Status Findings:  Cooperative, Alert and Oriented         Anesthesia Plan  Type of Anesthesia, risks & benefits discussed:  Anesthesia Type:  MAC  Patient's Preference:   Intra-op Monitoring Plan:   Intra-op Monitoring Plan Comments:   Post Op Pain Control Plan:   Post Op Pain Control Plan Comments:   Induction:    Beta Blocker:  Patient is not currently on a Beta-Blocker (No further documentation required).       Informed Consent: Patient understands risks and agrees with Anesthesia plan.  Questions answered. Anesthesia consent signed with patient.  ASA Score: 2     Day of Surgery Review of History & Physical:            Ready For Surgery From Anesthesia Perspective.

## 2018-05-04 NOTE — OP NOTE
Surgery Date: 5/4/2018      Surgeon(s) and Role:     * Mamadou Villarreal DPM - Primary     Assisting Surgeon: None     Pre-op Diagnosis:  Acquired hallux limitus of right foot [M20.5X1]     Post-op Diagnosis:  Post-Op Diagnosis Codes:     * Acquired hallux limitus of right foot [M20.5X1]     Procedure(s) (LRB):  FUSION- METATARSAL PHALANGEAL JOINT (MPJ) (Right)     Anesthesia: Local MAC    Hemostasis:  Ankle tourniquet set at 225 mmHg for 109 minutes    Estimated Blood Loss: <10 mL    Specimen: None    Culture: None    Complications: None    Condition: Stable    PRE-PROCEDURE:   The patient was brought into the operating room and placed on the operating table in the supine position. Following IV sedation, local anesthesia was obtained utilizing 20 cc's of a 1:1 mixture of 1% Lidocaine plain and 0.5% Marcaine plain. The foot and leg was then scrubbed, prepped, and draped in the usual aseptic manner.     PROCEDURE:   A #15 blade was used to make the skin incision over the 1st MTPJ down to the level of subcutaneous tissue on the medial aspect of the 1st ray. Care was taken to avoid damage to any neurovascular structures, which were reflected or cauterized as necessary. The incision was deepened down to the level of periosteum and capsule, which was incised longitudinally. The capsule was reflected medially and laterally to expose the 1st MTPJ. All soft tissue attachments to the head of the metatarsal were resected sharply and a Mcglamry elevator was used to resect all soft tissue attachments on the plantar and medial/lateral aspect. This allowed for proper exposure of the head of the bone. All soft tissue attachments were resected from the base of the proximal phalanx as well for proper exposure. Once this was obtained, a 0.062 K wire was driven through the central aspect of the metatarsal head for placement of the reamer. The reamer was used to resect all the cartilage from the surface of the head of the metatarsal.  A curette was used to remove remaining cartilage from the base of the phalanx. A k-wire was then used to subchondrally drill the joint surfaces. Once adequate cartilage was removed, all surrounding ledges of bone and soft tissue around the joint was resected using a rongeur. At this point, a kwire was driven through the medial-plantar aspect of the proximal phalanx and the hallux was positioned into the proper position of slight (5 degrees) of valgus and 10 degrees of dorsiflexion. Once this was obtained, the Kwire was advanced through the arthrodesis site for temporary fixation. Proper position and length of Kwire was noted on Alysia. Once this was done, a 3.0mm lag screw was placed across the fusion site and proper apposition was  obtained. This was noted using the Alysia. At this point, a short 6 hole MTPJ plate by [employer] was applied to the MTPJ with a combination of lockingand variable angle locking screws. A 3.0mm cortical screw was placed eccentrically in a proximal hole for further compression of the hallux at the MTPJ. At this point, the plate was noted to be placed in very good approximation to the bone and proper alignment of the 1st ray was noted visually as well as with Alysia. Proper AO techniques were used while preforming all hardware application.     Deep periosteal/capsular and subcutaneous tissue layer was reapproximated with 3-0 vicryl. Skin was reapproximated using 4-0 nylon. The tourniquet was deflated and a prompt hyperemic response was noted to all toes. The skin was cleansed with wet and dry gauze. The incision was covered with xeroform and covered with sterile 4x4 gauze, kerlix, and lightly wrapped ACE wrap. The foot was secured in a short walking boot.   The patient tolerated the procedure and anesthesia well. She was transferred to the recovery room with vital signs stable, vascular status intact, and capillary refill time < 3 seconds to the distal left foot. Following a period of post op  monitoring, the patient was discharged home on the following written and oral post op instructions:   1. Keep dressing dry and intact until clinic visit.  2. Avoid excessive ambulation, ambulate with surgical shoe on at all times with partial weightbearing on heel only with the help of crutches.  3. Ice and elevate right foot when at rest.  4. All prescriptions were given to patient pre and post-op.   5. Contact Dr. Villarreal for all post op follow up care and if any problems arise.

## 2018-05-04 NOTE — ANESTHESIA POSTPROCEDURE EVALUATION
"Anesthesia Post Evaluation    Patient: Supa Bass    Procedure(s) Performed: Procedure(s) (LRB):  FUSION- METATARSAL PHALANGEAL JOINT (MPJ) (Right)    Final Anesthesia Type: MAC  Patient location during evaluation: PACU  Patient participation: Yes- Able to Participate  Level of consciousness: awake and alert  Post-procedure vital signs: reviewed and stable  Pain management: adequate  Airway patency: patent  PONV status at discharge: No PONV  Anesthetic complications: no      Cardiovascular status: blood pressure returned to baseline and hemodynamically stable  Respiratory status: unassisted, spontaneous ventilation and room air  Hydration status: euvolemic  Follow-up not needed.        Visit Vitals  /71   Pulse 60   Temp 36.2 °C (97.2 °F) (Skin)   Resp 14   Ht 5' 6" (1.676 m)   Wt 79.4 kg (175 lb)   SpO2 97%   BMI 28.25 kg/m²       Pain/Dayami Score: Pain Assessment Performed: Yes (5/4/2018  9:30 AM)  Presence of Pain: denies (5/4/2018  9:30 AM)  Dayami Score: 10 (5/4/2018  9:30 AM)      "

## 2018-05-04 NOTE — INTERVAL H&P NOTE
The patient has been examined and the H&P has been reviewed:    I concur with the findings and no changes have occurred since H&P was written.    Anesthesia/Surgery risks, benefits and alternative options discussed and understood by patient/family.          Active Hospital Problems    Diagnosis  POA    Acquired hallux limitus of right foot [M20.5X1]  Yes      Resolved Hospital Problems    Diagnosis Date Resolved POA   No resolved problems to display.

## 2018-05-04 NOTE — PLAN OF CARE
Patient tolerating oral liquids without difficulty. No apparent s&s of distress noted at this time, no complaints voiced at this time. Dressing C,D,I to right foot and surgical boot in place. Pt owns crutches and verbalizes use of them. Discharge instructions reviewed with patient/family/friend with good verbal feedback received. Patient ready for discharge

## 2018-05-04 NOTE — TRANSFER OF CARE
"Anesthesia Transfer of Care Note    Patient: Supa Bass    Procedure(s) Performed: Procedure(s) (LRB):  FUSION- METATARSAL PHALANGEAL JOINT (MPJ) (Right)    Patient location: PACU    Anesthesia Type: MAC    Transport from OR: Transported from OR on room air with adequate spontaneous ventilation    Post pain: adequate analgesia    Post assessment: no apparent anesthetic complications    Post vital signs: stable    Level of consciousness: awake    Nausea/Vomiting: no nausea/vomiting    Complications: none    Transfer of care protocol was followed      Last vitals:   Visit Vitals  /89 (BP Location: Right arm, Patient Position: Sitting)   Pulse (!) 55   Temp 36.6 °C (97.9 °F) (Skin)   Resp 16   Ht 5' 6" (1.676 m)   Wt 79.4 kg (175 lb)   SpO2 100%   BMI 28.25 kg/m²     "

## 2018-05-04 NOTE — DISCHARGE INSTRUCTIONS
FOOT SURGERY  After surgery:    DOS:   Keep leg elevated until first post operative visit   Keep ice pack on the foot for 48-72 hours   Ice on for 20 minutes of each hour while awake   Keep dressing clean and dry DO NOT CHANGE BANDAGES   Advance diet as tolerated.    Check circulation frequently in toes by pressing down on toenail. Nail should turn white and then pink WITHIN 3 SECONDS when released.   Leave boot on all times until post op visit even to sleep   No weight bearing to right foot. Use crutches or knee roller   Resume home medications    DONT:   Do not remove your dressing   Do not get dressing wet.   No driving until released by MD   DO NOT TAKE ADDITIONAL TYLENOL/ACETAMINOPHEN WHILE TAKING NARCOTIC PAIN MEDICATION THAT CONTAINS TYLENOL/ACETAMINOPHEN.    CALL PHYSICIAN FOR:   Burning, or numbness of the toes not relieved by elevation of the leg.   Pale or cold toes; bluish nail beds.   Redness, swelling, or bleeding.   Fever> 101   Drainage (pus) from the operative sites   Pain unrelieved by pain medication    FOR EMERGENCIES CONTACT YOUR PHYSICIAN'S OFFICE        Discharge Instructions: After Your Surgery  Youve just had surgery. During surgery, you were given medicine called anesthesia to keep you relaxed and free of pain. After surgery, you may have some pain or nausea. This is common. Here are some tips for feeling better and getting well after surgery.     Stay on schedule with your medicine.   Going home  Your healthcare provider will show you how to take care of yourself when you go home. He or she will also answer your questions. Have an adult family member or friend drive you home. For the first 24 hours after your surgery:  · Do not drive or use heavy equipment.  · Do not make important decisions or sign legal papers.  · Do not drink alcohol.  · Have someone stay with you, if needed. He or she can watch for problems and help keep you safe.  Be sure to go to all follow-up  visits with your healthcare provider. And rest after your surgery for as long as your healthcare provider tells you to.  Coping with pain  If you have pain after surgery, pain medicine will help you feel better. Take it as told, before pain becomes severe. Also, ask your healthcare provider or pharmacist about other ways to control pain. This might be with heat, ice, or relaxation. And follow any other instructions your surgeon or nurse gives you.  Tips for taking pain medicine  To get the best relief possible, remember these points:  · Pain medicines can upset your stomach. Taking them with a little food may help.  · Most pain relievers taken by mouth need at least 20 to 30 minutes to start to work.  · Taking medicine on a schedule can help you remember to take it. Try to time your medicine so that you can take it before starting an activity. This might be before you get dressed, go for a walk, or sit down for dinner.  · Constipation is a common side effect of pain medicines. Call your healthcare provider before taking any medicines such as laxatives or stool softeners to help ease constipation. Also ask if you should skip any foods. Drinking lots of fluids and eating foods such as fruits and vegetables that are high in fiber can also help. Remember, do not take laxatives unless your surgeon has prescribed them.  · Drinking alcohol and taking pain medicine can cause dizziness and slow your breathing. It can even be deadly. Do not drink alcohol while taking pain medicine.  · Pain medicine can make you react more slowly to things. Do not drive or run machinery while taking pain medicine.  Your healthcare provider may tell you to take acetaminophen to help ease your pain. Ask him or her how much you are supposed to take each day. Acetaminophen or other pain relievers may interact with your prescription medicines or other over-the-counter (OTC) medicines. Some prescription medicines have acetaminophen and other  ingredients. Using both prescription and OTC acetaminophen for pain can cause you to overdose. Read the labels on your OTC medicines with care. This will help you to clearly know the list of ingredients, how much to take, and any warnings. It may also help you not take too much acetaminophen. If you have questions or do not understand the information, ask your pharmacist or healthcare provider to explain it to you before you take the OTC medicine.  Managing nausea  Some people have an upset stomach after surgery. This is often because of anesthesia, pain, or pain medicine, or the stress of surgery. These tips will help you handle nausea and eat healthy foods as you get better. If you were on a special food plan before surgery, ask your healthcare provider if you should follow it while you get better. These tips may help:  · Do not push yourself to eat. Your body will tell you when to eat and how much.  · Start off with clear liquids and soup. They are easier to digest.  · Next try semi-solid foods, such as mashed potatoes, applesauce, and gelatin, as you feel ready.  · Slowly move to solid foods. Dont eat fatty, rich, or spicy foods at first.  · Do not force yourself to have 3 large meals a day. Instead eat smaller amounts more often.  · Take pain medicines with a small amount of solid food, such as crackers or toast, to avoid nausea.     Call your surgeon if  · You still have pain an hour after taking medicine. The medicine may not be strong enough.  · You feel too sleepy, dizzy, or groggy. The medicine may be too strong.  · You have side effects like nausea, vomiting, or skin changes, such as rash, itching, or hives.       If you have obstructive sleep apnea  You were given anesthesia medicine during surgery to keep you comfortable and free of pain. After surgery, you may have more apnea spells because of this medicine and other medicines you were given. The spells may last longer than usual.   At home:  · Keep  using the continuous positive airway pressure (CPAP) device when you sleep. Unless your healthcare provider tells you not to, use it when you sleep, day or night. CPAP is a common device used to treat obstructive sleep apnea.  · Talk with your provider before taking any pain medicine, muscle relaxants, or sedatives. Your provider will tell you about the possible dangers of taking these medicines.  Date Last Reviewed: 12/1/2016  © 5466-4937 [employer]. [address] [place] [address], [place] [address]. All rights reserved. This information is not intended as a substitute for professional medical care. Always follow your healthcare professional's instructions.

## 2018-05-07 VITALS
WEIGHT: 175 LBS | TEMPERATURE: 97 F | BODY MASS INDEX: 28.12 KG/M2 | OXYGEN SATURATION: 98 % | DIASTOLIC BLOOD PRESSURE: 75 MMHG | SYSTOLIC BLOOD PRESSURE: 106 MMHG | RESPIRATION RATE: 14 BRPM | HEIGHT: 66 IN | HEART RATE: 57 BPM

## 2018-05-11 ENCOUNTER — OFFICE VISIT (OUTPATIENT)
Dept: PODIATRY | Facility: CLINIC | Age: 48
End: 2018-05-11
Payer: OTHER GOVERNMENT

## 2018-05-11 VITALS — HEIGHT: 66 IN | BODY MASS INDEX: 28.14 KG/M2 | WEIGHT: 175.06 LBS

## 2018-05-11 DIAGNOSIS — Z98.890 POST-OPERATIVE STATE: Primary | ICD-10-CM

## 2018-05-11 PROCEDURE — 99999 PR PBB SHADOW E&M-EST. PATIENT-LVL III: CPT | Mod: PBBFAC,,, | Performed by: PODIATRIST

## 2018-05-11 PROCEDURE — 99024 POSTOP FOLLOW-UP VISIT: CPT | Mod: ,,, | Performed by: PODIATRIST

## 2018-05-11 PROCEDURE — 99213 OFFICE O/P EST LOW 20 MIN: CPT | Mod: PBBFAC,PN | Performed by: PODIATRIST
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
## 2018-05-18 ENCOUNTER — OFFICE VISIT (OUTPATIENT)
Dept: PODIATRY | Facility: CLINIC | Age: 48
End: 2018-05-18
Payer: OTHER GOVERNMENT

## 2018-05-18 VITALS — BODY MASS INDEX: 28.14 KG/M2 | HEIGHT: 66 IN | WEIGHT: 175.06 LBS

## 2018-05-18 DIAGNOSIS — Z98.890 POST-OPERATIVE STATE: Primary | ICD-10-CM

## 2018-05-18 PROCEDURE — 99999 PR PBB SHADOW E&M-EST. PATIENT-LVL II: CPT | Mod: PBBFAC,,, | Performed by: PODIATRIST

## 2018-05-18 PROCEDURE — 99212 OFFICE O/P EST SF 10 MIN: CPT | Mod: PBBFAC,PN | Performed by: PODIATRIST

## 2018-05-18 PROCEDURE — 99024 POSTOP FOLLOW-UP VISIT: CPT | Mod: ,,, | Performed by: PODIATRIST
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
## 2018-05-22 ENCOUNTER — LAB VISIT (OUTPATIENT)
Dept: LAB | Facility: HOSPITAL | Age: 48
End: 2018-05-22
Attending: INTERNAL MEDICINE
Payer: OTHER GOVERNMENT

## 2018-05-22 DIAGNOSIS — I10 ESSENTIAL HYPERTENSION: ICD-10-CM

## 2018-05-22 DIAGNOSIS — E78.1 HIGH TRIGLYCERIDES: ICD-10-CM

## 2018-05-22 DIAGNOSIS — R74.01 ELEVATED ALT MEASUREMENT: ICD-10-CM

## 2018-05-22 DIAGNOSIS — N17.9 AKI (ACUTE KIDNEY INJURY): ICD-10-CM

## 2018-05-22 DIAGNOSIS — E78.5 DYSLIPIDEMIA: ICD-10-CM

## 2018-05-22 LAB
ALBUMIN SERPL BCP-MCNC: 3.9 G/DL
ALP SERPL-CCNC: 76 U/L
ALT SERPL W/O P-5'-P-CCNC: 20 U/L
ANION GAP SERPL CALC-SCNC: 7 MMOL/L
AST SERPL-CCNC: 19 U/L
BILIRUB SERPL-MCNC: 0.8 MG/DL
BUN SERPL-MCNC: 15 MG/DL
CALCIUM SERPL-MCNC: 9.6 MG/DL
CHLORIDE SERPL-SCNC: 107 MMOL/L
CHOLEST SERPL-MCNC: 135 MG/DL
CHOLEST/HDLC SERPL: 3.8 {RATIO}
CO2 SERPL-SCNC: 28 MMOL/L
CREAT SERPL-MCNC: 1.3 MG/DL
EST. GFR  (AFRICAN AMERICAN): >60 ML/MIN/1.73 M^2
EST. GFR  (NON AFRICAN AMERICAN): >60 ML/MIN/1.73 M^2
GLUCOSE SERPL-MCNC: 108 MG/DL
HDLC SERPL-MCNC: 36 MG/DL
HDLC SERPL: 26.7 %
LDLC SERPL CALC-MCNC: 66 MG/DL
NONHDLC SERPL-MCNC: 99 MG/DL
POTASSIUM SERPL-SCNC: 3.9 MMOL/L
PROT SERPL-MCNC: 6.8 G/DL
SODIUM SERPL-SCNC: 142 MMOL/L
TRIGL SERPL-MCNC: 165 MG/DL

## 2018-05-22 PROCEDURE — 36415 COLL VENOUS BLD VENIPUNCTURE: CPT | Mod: PO

## 2018-05-22 PROCEDURE — 80061 LIPID PANEL: CPT

## 2018-05-22 PROCEDURE — 80053 COMPREHEN METABOLIC PANEL: CPT

## 2018-05-29 ENCOUNTER — OFFICE VISIT (OUTPATIENT)
Dept: FAMILY MEDICINE | Facility: CLINIC | Age: 48
End: 2018-05-29
Payer: OTHER GOVERNMENT

## 2018-05-29 VITALS
WEIGHT: 182.13 LBS | BODY MASS INDEX: 29.27 KG/M2 | OXYGEN SATURATION: 97 % | HEART RATE: 44 BPM | DIASTOLIC BLOOD PRESSURE: 82 MMHG | HEIGHT: 66 IN | RESPIRATION RATE: 18 BRPM | SYSTOLIC BLOOD PRESSURE: 118 MMHG

## 2018-05-29 DIAGNOSIS — Z12.5 ENCOUNTER FOR SCREENING FOR MALIGNANT NEOPLASM OF PROSTATE: ICD-10-CM

## 2018-05-29 DIAGNOSIS — R79.89 ELEVATED LFTS: ICD-10-CM

## 2018-05-29 DIAGNOSIS — E78.5 DYSLIPIDEMIA: Primary | ICD-10-CM

## 2018-05-29 DIAGNOSIS — I10 ESSENTIAL HYPERTENSION: ICD-10-CM

## 2018-05-29 PROCEDURE — 99214 OFFICE O/P EST MOD 30 MIN: CPT | Mod: S$PBB,,, | Performed by: INTERNAL MEDICINE

## 2018-05-29 PROCEDURE — 99999 PR PBB SHADOW E&M-EST. PATIENT-LVL III: CPT | Mod: PBBFAC,,, | Performed by: INTERNAL MEDICINE

## 2018-05-29 PROCEDURE — 99213 OFFICE O/P EST LOW 20 MIN: CPT | Mod: PBBFAC,PO | Performed by: INTERNAL MEDICINE

## 2018-05-29 RX ORDER — FENOFIBRATE 54 MG/1
54 TABLET ORAL DAILY
Qty: 30 TABLET | Refills: 6 | Status: SHIPPED | OUTPATIENT
Start: 2018-05-29 | End: 2018-12-03 | Stop reason: SDUPTHER

## 2018-05-29 NOTE — PROGRESS NOTES
Subjective:       Patient ID: Supa Bass is a 47 y.o. male.    Chief Complaint: Hypertension and Dyslipidemia    high triglycerides - [facility] showed trig in 300s.  Improved on fenofibrate and ls changes; [facility] Lovaza.  Elevated ALT - normal.  Strong FH autoimmune diseases  - mom, sister, daughter  Elevated LDL - controlled on statin; goal < 130 age, htn  HTN - controlled  ABEBE - resolved      Recall:   [facility] and [facility]; will bring/send labs to us    RAUL on cpap - not working right and needs a referral  PTSD from war - controlled on Wellbutrin and Lunesta      Review of Systems   Constitutional: Negative for appetite change and fever.   HENT: Negative for nosebleeds and trouble swallowing.    Eyes: Negative for discharge and visual disturbance.   Respiratory: Negative for choking and shortness of breath.    Cardiovascular: Negative for chest pain and palpitations.   Gastrointestinal: Negative for abdominal pain, nausea and vomiting.   Musculoskeletal: Negative for arthralgias and joint swelling.   Skin: Negative for rash and wound.   Neurological: Negative for dizziness and syncope.   Psychiatric/Behavioral: Negative for confusion and dysphoric mood.       Objective:      Vitals:    05/29/18 0809   BP: 118/82   Pulse: (!) 44   Resp: 18     Physical Exam   Constitutional: He appears well-nourished.   Eyes: Conjunctivae and EOM are normal.   Neck: Normal range of motion.   Cardiovascular: Normal rate and regular rhythm.    Pulmonary/Chest: Effort normal and breath sounds normal.   Musculoskeletal:   Normal ROM bilateral    Neurological: No cranial nerve deficit (grossly intact).   Skin: Skin is warm and dry.   Psychiatric: He has a normal mood and affect.   Alert and orientated   Vitals reviewed.        Assessment:       1. Dyslipidemia    2. Essential hypertension    3. Elevated LFTs    4. Encounter  for screening for malignant neoplasm of prostate        Plan:       Dyslipidemia  -     Lipid panel; Future; Expected date: 11/25/2018  -     fenofibrate (TRICOR) 54 MG tablet; Take 1 tablet (54 mg total) by mouth once daily.  Dispense: 30 tablet; Refill: 6    Essential hypertension  -     Comprehensive metabolic panel; Future; Expected date: 11/25/2018    Elevated LFTs  -     Comprehensive metabolic panel; Future; Expected date: 11/25/2018    Encounter for screening for malignant neoplasm of prostate  -     PSA, Screening; Future; Expected date: 11/25/2018            Medication List with Changes/Refills   Current Medications    ASPIRIN (ECOTRIN) 81 MG EC TABLET    Take 81 mg by mouth once daily.    ATORVASTATIN (LIPITOR) 40 MG TABLET    Take 40 mg by mouth once daily.    BUPROPION (WELLBUTRIN SR) 150 MG TBSR 12 HR TABLET    Take 150 mg by mouth 2 (two) times daily.    CYCLOBENZAPRINE (FLEXERIL) 10 MG TABLET    TK ONE T PO Q 12 H PRN    ESOMEPRAZOLE (NEXIUM) 40 MG CAPSULE    Take 40 mg by mouth once daily.    ESZOPICLONE (LUNESTA) 2 MG TAB    Take 2 mg by mouth nightly.    LOSARTAN (COZAAR) 100 MG TABLET    Take 100 mg by mouth once daily.    METOPROLOL SUCCINATE (TOPROL-XL) 50 MG 24 HR TABLET    Take 50 mg by mouth once daily.   Changed and/or Refilled Medications    Modified Medication Previous Medication    FENOFIBRATE (TRICOR) 54 MG TABLET fenofibrate (TRICOR) 54 MG tablet       Take 1 tablet (54 mg total) by mouth once daily.    Take 1 tablet (54 mg total) by mouth once daily.   Discontinued Medications    OXYCODONE-ACETAMINOPHEN (PERCOCET) 5-325 MG PER TABLET    Take 1 tablet by mouth every 4 (four) hours as needed for Pain.       Continue current management    Counseled on regular exercise, maintenance of a healthy weight, balanced diet rich in fruits/vegetables and lean protein, and avoidance of unhealthy habits like smoking and excessive alcohol intake.   Also, counseled on importance of being compliant  "with medication, health appointments, diet and exercise.     Follow-up in about 6 months (around 11/29/2018).    "This note will not be shared with the patient."  "

## 2018-06-22 ENCOUNTER — OFFICE VISIT (OUTPATIENT)
Dept: PODIATRY | Facility: CLINIC | Age: 48
End: 2018-06-22
Payer: OTHER GOVERNMENT

## 2018-06-22 ENCOUNTER — HOSPITAL ENCOUNTER (OUTPATIENT)
Dept: RADIOLOGY | Facility: HOSPITAL | Age: 48
Discharge: HOME | End: 2018-06-22
Attending: PODIATRIST
Payer: OTHER GOVERNMENT

## 2018-06-22 VITALS — BODY MASS INDEX: 29.27 KG/M2 | WEIGHT: 182.13 LBS | HEIGHT: 66 IN

## 2018-06-22 DIAGNOSIS — Z98.890 POST-OPERATIVE STATE: ICD-10-CM

## 2018-06-22 DIAGNOSIS — Z98.890 POST-OPERATIVE STATE: Primary | ICD-10-CM

## 2018-06-22 PROCEDURE — 73630 X-RAY EXAM OF FOOT: CPT | Mod: TC,PO,RT

## 2018-06-22 PROCEDURE — 99999 PR PBB SHADOW E&M-EST. PATIENT-LVL III: CPT | Mod: PBBFAC,,, | Performed by: PODIATRIST

## 2018-06-22 PROCEDURE — 99213 OFFICE O/P EST LOW 20 MIN: CPT | Mod: PBBFAC,25,PN | Performed by: PODIATRIST

## 2018-06-22 PROCEDURE — 99024 POSTOP FOLLOW-UP VISIT: CPT | Mod: ,,, | Performed by: PODIATRIST

## 2018-06-22 PROCEDURE — 73630 X-RAY EXAM OF FOOT: CPT | Mod: 26,RT,, | Performed by: RADIOLOGY
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
## 2018-07-23 ENCOUNTER — OFFICE VISIT (OUTPATIENT)
Dept: PODIATRY | Facility: CLINIC | Age: 48
End: 2018-07-23
Payer: OTHER GOVERNMENT

## 2018-07-23 VITALS — BODY MASS INDEX: 29.27 KG/M2 | WEIGHT: 182.13 LBS | HEIGHT: 66 IN

## 2018-07-23 DIAGNOSIS — Z98.890 POST-OPERATIVE STATE: Primary | ICD-10-CM

## 2018-07-23 PROCEDURE — 99213 OFFICE O/P EST LOW 20 MIN: CPT | Mod: PBBFAC,PN | Performed by: PODIATRIST

## 2018-07-23 PROCEDURE — 99024 POSTOP FOLLOW-UP VISIT: CPT | Mod: ,,, | Performed by: PODIATRIST

## 2018-07-23 PROCEDURE — 99999 PR PBB SHADOW E&M-EST. PATIENT-LVL III: CPT | Mod: PBBFAC,,, | Performed by: PODIATRIST
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
## 2018-11-26 ENCOUNTER — LAB VISIT (OUTPATIENT)
Dept: LAB | Facility: HOSPITAL | Age: 48
End: 2018-11-26
Attending: INTERNAL MEDICINE
Payer: OTHER GOVERNMENT

## 2018-11-26 DIAGNOSIS — Z12.5 ENCOUNTER FOR SCREENING FOR MALIGNANT NEOPLASM OF PROSTATE: ICD-10-CM

## 2018-11-26 DIAGNOSIS — E78.5 DYSLIPIDEMIA: ICD-10-CM

## 2018-11-26 DIAGNOSIS — R79.89 ELEVATED LFTS: ICD-10-CM

## 2018-11-26 DIAGNOSIS — I10 ESSENTIAL HYPERTENSION: ICD-10-CM

## 2018-11-26 LAB
ALBUMIN SERPL BCP-MCNC: 3.8 G/DL
ALP SERPL-CCNC: 61 U/L
ALT SERPL W/O P-5'-P-CCNC: 27 U/L
ANION GAP SERPL CALC-SCNC: 8 MMOL/L
AST SERPL-CCNC: 25 U/L
BILIRUB SERPL-MCNC: 0.8 MG/DL
BUN SERPL-MCNC: 16 MG/DL
CALCIUM SERPL-MCNC: 9.4 MG/DL
CHLORIDE SERPL-SCNC: 108 MMOL/L
CHOLEST SERPL-MCNC: 126 MG/DL
CHOLEST/HDLC SERPL: 3.3 {RATIO}
CO2 SERPL-SCNC: 26 MMOL/L
COMPLEXED PSA SERPL-MCNC: 0.52 NG/ML
CREAT SERPL-MCNC: 1.2 MG/DL
EST. GFR  (AFRICAN AMERICAN): >60 ML/MIN/1.73 M^2
EST. GFR  (NON AFRICAN AMERICAN): >60 ML/MIN/1.73 M^2
GLUCOSE SERPL-MCNC: 92 MG/DL
HDLC SERPL-MCNC: 38 MG/DL
HDLC SERPL: 30.2 %
LDLC SERPL CALC-MCNC: 61.6 MG/DL
NONHDLC SERPL-MCNC: 88 MG/DL
POTASSIUM SERPL-SCNC: 4 MMOL/L
PROT SERPL-MCNC: 6.4 G/DL
SODIUM SERPL-SCNC: 142 MMOL/L
TRIGL SERPL-MCNC: 132 MG/DL

## 2018-11-26 PROCEDURE — 80061 LIPID PANEL: CPT

## 2018-11-26 PROCEDURE — 36415 COLL VENOUS BLD VENIPUNCTURE: CPT | Mod: PO

## 2018-11-26 PROCEDURE — 80053 COMPREHEN METABOLIC PANEL: CPT

## 2018-11-26 PROCEDURE — 84153 ASSAY OF PSA TOTAL: CPT

## 2018-11-29 ENCOUNTER — OFFICE VISIT (OUTPATIENT)
Dept: FAMILY MEDICINE | Facility: CLINIC | Age: 48
End: 2018-11-29
Payer: OTHER GOVERNMENT

## 2018-11-29 VITALS
SYSTOLIC BLOOD PRESSURE: 110 MMHG | HEIGHT: 66 IN | DIASTOLIC BLOOD PRESSURE: 78 MMHG | OXYGEN SATURATION: 97 % | BODY MASS INDEX: 29.05 KG/M2 | HEART RATE: 57 BPM | WEIGHT: 180.75 LBS

## 2018-11-29 DIAGNOSIS — Z00.00 ROUTINE PHYSICAL EXAMINATION: Primary | ICD-10-CM

## 2018-11-29 DIAGNOSIS — I10 ESSENTIAL HYPERTENSION: ICD-10-CM

## 2018-11-29 DIAGNOSIS — E78.5 DYSLIPIDEMIA: ICD-10-CM

## 2018-11-29 PROCEDURE — 99213 OFFICE O/P EST LOW 20 MIN: CPT | Mod: PBBFAC,PO | Performed by: INTERNAL MEDICINE

## 2018-11-29 PROCEDURE — 99396 PREV VISIT EST AGE 40-64: CPT | Mod: S$PBB,,, | Performed by: INTERNAL MEDICINE

## 2018-11-29 PROCEDURE — 99999 PR PBB SHADOW E&M-EST. PATIENT-LVL III: CPT | Mod: PBBFAC,,, | Performed by: INTERNAL MEDICINE
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
## 2018-12-03 ENCOUNTER — PATIENT MESSAGE (OUTPATIENT)
Dept: FAMILY MEDICINE | Facility: CLINIC | Age: 48
End: 2018-12-03

## 2018-12-03 DIAGNOSIS — E78.5 DYSLIPIDEMIA: ICD-10-CM

## 2018-12-05 RX ORDER — FENOFIBRATE 54 MG/1
54 TABLET ORAL DAILY
Qty: 30 TABLET | Refills: 6 | Status: SHIPPED | OUTPATIENT
Start: 2018-12-05 | End: 2019-05-29 | Stop reason: SDUPTHER

## 2019-01-29 ENCOUNTER — OFFICE VISIT (OUTPATIENT)
Dept: FAMILY MEDICINE | Facility: CLINIC | Age: 49
End: 2019-01-29
Payer: OTHER GOVERNMENT

## 2019-01-29 VITALS
WEIGHT: 182.56 LBS | HEART RATE: 55 BPM | HEIGHT: 66 IN | SYSTOLIC BLOOD PRESSURE: 120 MMHG | OXYGEN SATURATION: 98 % | BODY MASS INDEX: 29.34 KG/M2 | RESPIRATION RATE: 14 BRPM | DIASTOLIC BLOOD PRESSURE: 84 MMHG

## 2019-01-29 DIAGNOSIS — M54.41 ACUTE RIGHT-SIDED LOW BACK PAIN WITH RIGHT-SIDED SCIATICA: Primary | ICD-10-CM

## 2019-01-29 DIAGNOSIS — M54.41 ACUTE RIGHT-SIDED LOW BACK PAIN WITH RIGHT-SIDED SCIATICA: ICD-10-CM

## 2019-01-29 PROCEDURE — 99213 OFFICE O/P EST LOW 20 MIN: CPT | Mod: PBBFAC,PO,25 | Performed by: INTERNAL MEDICINE

## 2019-01-29 PROCEDURE — 96372 THER/PROPH/DIAG INJ SC/IM: CPT | Mod: PBBFAC,PO

## 2019-01-29 PROCEDURE — 99999 PR PBB SHADOW E&M-EST. PATIENT-LVL III: CPT | Mod: PBBFAC,,, | Performed by: INTERNAL MEDICINE

## 2019-01-29 PROCEDURE — 99999 PR PBB SHADOW E&M-EST. PATIENT-LVL III: ICD-10-PCS | Mod: PBBFAC,,, | Performed by: INTERNAL MEDICINE

## 2019-01-29 PROCEDURE — 99214 OFFICE O/P EST MOD 30 MIN: CPT | Mod: S$PBB,,, | Performed by: INTERNAL MEDICINE

## 2019-01-29 PROCEDURE — 99214 PR OFFICE/OUTPT VISIT, EST, LEVL IV, 30-39 MIN: ICD-10-PCS | Mod: S$PBB,,, | Performed by: INTERNAL MEDICINE

## 2019-01-29 RX ORDER — CYCLOBENZAPRINE HCL 10 MG
10 TABLET ORAL 3 TIMES DAILY PRN
Qty: 30 TABLET | Refills: 0 | Status: SHIPPED | OUTPATIENT
Start: 2019-01-29 | End: 2019-02-08

## 2019-01-29 RX ORDER — KETOROLAC TROMETHAMINE 30 MG/ML
30 INJECTION, SOLUTION INTRAMUSCULAR; INTRAVENOUS ONCE
Status: COMPLETED | OUTPATIENT
Start: 2019-01-29 | End: 2019-01-29

## 2019-01-29 RX ORDER — NAPROXEN 500 MG/1
500 TABLET ORAL 2 TIMES DAILY PRN
Qty: 45 TABLET | Refills: 1 | Status: SHIPPED | OUTPATIENT
Start: 2019-01-29

## 2019-01-29 RX ORDER — METHYLPREDNISOLONE 4 MG/1
TABLET ORAL
Qty: 21 TABLET | Refills: 0 | Status: SHIPPED | OUTPATIENT
Start: 2019-01-29 | End: 2019-05-29 | Stop reason: ALTCHOICE

## 2019-01-29 RX ADMIN — KETOROLAC TROMETHAMINE 30 MG: 30 INJECTION INTRAMUSCULAR; INTRAVENOUS at 11:01

## 2019-01-29 NOTE — PROGRESS NOTES
Subjective:       Patient ID: Supa Bass is a 48 y.o. male.    Chief Complaint: Back Pain    Complains of moderate to severe low back pain for 2 weeks radiating down his right anterior thigh.  Happened before but only lasted 2 days and did not radiate.  No numbness or weakness.       Review of Systems   Constitutional: Negative for appetite change and fever.   HENT: Negative for nosebleeds and trouble swallowing.    Eyes: Negative for discharge and visual disturbance.   Respiratory: Negative for choking and shortness of breath.    Cardiovascular: Negative for chest pain and palpitations.   Gastrointestinal: Negative for abdominal pain, nausea and vomiting.   Musculoskeletal: Positive for back pain. Negative for arthralgias and joint swelling.   Skin: Negative for rash and wound.   Neurological: Negative for dizziness, syncope and numbness.   Psychiatric/Behavioral: Negative for confusion and dysphoric mood.       Objective:      Vitals:    01/29/19 1124   BP: 120/84   Pulse: (!) 55   Resp: 14     Physical Exam   Constitutional: He appears well-nourished.   Eyes: Conjunctivae and EOM are normal.   Neck: Normal range of motion.   Pulmonary/Chest: Effort normal.   Musculoskeletal:   Right lower back/ upper buttocks and lateral right buttocks + TTP   Neurological: No cranial nerve deficit (grossly intact).   Skin: Skin is warm and dry.   Psychiatric: He has a normal mood and affect.   Alert and orientated   Vitals reviewed.        Assessment:       1. Acute right-sided low back pain with right-sided sciatica        Plan:       Acute right-sided low back pain with right-sided sciatica  -     ketorolac injection 30 mg  -     Ambulatory consult to Physical Therapy  -     cyclobenzaprine (FLEXERIL) 10 MG tablet; Take 1 tablet (10 mg total) by mouth 3 (three) times daily as needed for Muscle spasms.  Dispense: 30 tablet; Refill: 0  -     methylPREDNISolone (MEDROL DOSEPACK) 4 mg tablet; use as directed  Dispense:  21 tablet; Refill: 0  -     naproxen (NAPROSYN) 500 MG tablet; Take 1 tablet (500 mg total) by mouth 2 (two) times daily as needed.  Dispense: 45 tablet; Refill: 1               Medication List           Accurate as of 1/29/19 11:35 AM. If you have any questions, ask your nurse or doctor.               START taking these medications    cyclobenzaprine 10 MG tablet  Commonly known as:  FLEXERIL  Take 1 tablet (10 mg total) by mouth 3 (three) times daily as needed for Muscle spasms.  Started by:  Taqueria Santiago MD     methylPREDNISolone 4 mg tablet  Commonly known as:  MEDROL DOSEPACK  use as directed  Started by:  Taqueria Santiago MD     naproxen 500 MG tablet  Commonly known as:  NAPROSYN  Take 1 tablet (500 mg total) by mouth 2 (two) times daily as needed.  Started by:  Taqueria Santiago MD        CONTINUE taking these medications    aspirin 81 MG EC tablet  Commonly known as:  ECOTRIN     atorvastatin 40 MG tablet  Commonly known as:  LIPITOR     buPROPion 150 MG TBSR 12 hr tablet  Commonly known as:  WELLBUTRIN SR     esomeprazole 40 MG capsule  Commonly known as:  NEXIUM     eszopiclone 2 MG Tab  Commonly known as:  LUNESTA     fenofibrate 54 MG tablet  Commonly known as:  TRICOR  Take 1 tablet (54 mg total) by mouth once daily.     losartan 100 MG tablet  Commonly known as:  COZAAR     metoprolol succinate 50 MG 24 hr tablet  Commonly known as:  TOPROL-XL           Where to Get Your Medications      These medications were sent to [facility] [address] [place] [address] AT SEC OF [address] [facility] & [address] [address]    Phone:  [phone]   · cyclobenzaprine 10 MG tablet  · methylPREDNISolone 4 mg tablet  · naproxen 500 MG tablet         Continue current management    Counseled on regular exercise, maintenance of a healthy weight, balanced diet rich in fruits/vegetables and lean protein, and avoidance of unhealthy habits like smoking and excessive alcohol intake.    "Also, counseled on importance of being compliant with medication, health appointments, diet and exercise.     Follow-up if symptoms worsen or fail to improve.    "This note will not be shared with the patient."  "

## 2019-01-31 RX ORDER — NAPROXEN 500 MG/1
TABLET ORAL
Qty: 184 TABLET | Refills: 1 | OUTPATIENT
Start: 2019-01-31

## 2019-02-05 ENCOUNTER — CLINICAL SUPPORT (OUTPATIENT)
Dept: REHABILITATION | Facility: HOSPITAL | Age: 49
End: 2019-02-05
Attending: INTERNAL MEDICINE
Payer: OTHER GOVERNMENT

## 2019-02-05 DIAGNOSIS — G89.29 CHRONIC RIGHT-SIDED LOW BACK PAIN WITH RIGHT-SIDED SCIATICA: Primary | ICD-10-CM

## 2019-02-05 DIAGNOSIS — M54.41 CHRONIC RIGHT-SIDED LOW BACK PAIN WITH RIGHT-SIDED SCIATICA: Primary | ICD-10-CM

## 2019-02-05 DIAGNOSIS — M62.81 MUSCLE WEAKNESS: ICD-10-CM

## 2019-02-05 PROCEDURE — 97110 THERAPEUTIC EXERCISES: CPT | Mod: PN | Performed by: PHYSICAL THERAPIST

## 2019-02-05 PROCEDURE — 97162 PT EVAL MOD COMPLEX 30 MIN: CPT | Mod: PN | Performed by: PHYSICAL THERAPIST
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
## 2019-02-07 ENCOUNTER — CLINICAL SUPPORT (OUTPATIENT)
Dept: REHABILITATION | Facility: HOSPITAL | Age: 49
End: 2019-02-07
Attending: INTERNAL MEDICINE
Payer: OTHER GOVERNMENT

## 2019-02-07 DIAGNOSIS — G89.29 CHRONIC RIGHT-SIDED LOW BACK PAIN WITH RIGHT-SIDED SCIATICA: Primary | ICD-10-CM

## 2019-02-07 DIAGNOSIS — M54.41 CHRONIC RIGHT-SIDED LOW BACK PAIN WITH RIGHT-SIDED SCIATICA: Primary | ICD-10-CM

## 2019-02-07 DIAGNOSIS — M62.81 MUSCLE WEAKNESS: ICD-10-CM

## 2019-02-07 PROCEDURE — 97110 THERAPEUTIC EXERCISES: CPT | Mod: PN | Performed by: PHYSICAL THERAPIST

## 2019-02-07 NOTE — PROGRESS NOTES
Physical Therapy Daily Note     Name: Supa Bass  Clinic Number: [id number]  Diagnosis:   Encounter Diagnoses   Name Primary?    Chronic right-sided low back pain with right-sided sciatica Yes    Muscle weakness      Physician: Taqueria Santiago MD  Treatment Orders: PT Eval and Treat  Past Medical History:   Diagnosis Date    GERD (gastroesophageal reflux disease)     Hearing loss     HTN (hypertension)     Hyperlipidemia     RAUL (obstructive sleep apnea)     CPAP at night    PTSD (post-traumatic stress disorder)        Precautions: standard  Evaluation date: 2/5/2019  Visit # authorized: 2/16  Authorization period: 5-30-19  Plan of care expiration: 3-19-19  MD Follow up appt: 5-29-19    Time In:  1:00  Time Out:  1:38  Billable Time:  30 min    Subjective     Pt reports: feeling better no back pain just a little hip pain    Pain Scale: before treatment: 0 back pain just a little hip pain 1-2 in hip pointing to greater trochanter region currently; after treatment: 0, but  to palpation    Objective   Slight pelvic dysfunction to start          TREATMENT  Therapeutic exercise: Supa received therapeutic exercises to develop strength, endurance, ROM, flexibility and core stabilization for 30 minutes including:     HS stretch x 10  Piriformis stretch x 10  Bridge x 10   Pelvic tilt x 10   Trunk rotation supine x 10  Hip abduction sidelying x 10   Hip ext prone x 10  Modified Push/pull x 3       Written Home Exercises Provided: indented ex noted above  Pt demo good understanding of the education provided. Supa demonstrated good return demonstration of activities.     Pt. education:  · Posture reeducation, body mechanics, HEP,   · No spiritual or educational barriers to learning provided  · Pt has no cultural, educational or language barriers to learning provided.    Assessment     Pt with decrease in symptoms overall, Patient appears to understand increased awareness of symptoms and  to perform push/pull at onset of increased symptoms.    Gera progression well   Pt will continue to benefit from skilled outpatient physical therapy to address the remaining functional deficits, provide pt/family education, and to maximize pt's level of independence in the home and community environment. .     GOALS:   Short Term Goals:  3 weeks  Increase range of motion 25%  Increase strength 1/2 muscle grade  Improve postural awareness of pelvis to independently identify dysfunction with min assist from PT  Be able to perform HEP with minimal cueing required     Long Term Goals: 6 weeks  Increase range of motion to 75% to 100% full   Improve muscle strength 1 muscle grade  Improve and stabilize proper pelvic positioning  Restore ability to stand for ADL without increased pain  Restore ability to sit for ADL without increased pain  Restore ability to perform ADL's and household activities independently and without increased pain    Anticipated barriers to physical therapy: none  Pt's spiritual, cultural and educational needs considered and pt agreeable to plan of care and goals        Plan   Continue with established Plan of Care towards PT goals.

## 2019-02-12 ENCOUNTER — CLINICAL SUPPORT (OUTPATIENT)
Dept: REHABILITATION | Facility: HOSPITAL | Age: 49
End: 2019-02-12
Attending: INTERNAL MEDICINE
Payer: OTHER GOVERNMENT

## 2019-02-12 DIAGNOSIS — M62.81 MUSCLE WEAKNESS: ICD-10-CM

## 2019-02-12 DIAGNOSIS — G89.29 CHRONIC RIGHT-SIDED LOW BACK PAIN WITH RIGHT-SIDED SCIATICA: Primary | ICD-10-CM

## 2019-02-12 DIAGNOSIS — M54.41 CHRONIC RIGHT-SIDED LOW BACK PAIN WITH RIGHT-SIDED SCIATICA: Primary | ICD-10-CM

## 2019-02-12 PROCEDURE — 97110 THERAPEUTIC EXERCISES: CPT | Mod: PN | Performed by: PHYSICAL THERAPIST

## 2019-02-12 NOTE — PROGRESS NOTES
Physical Therapy Daily Note     Name: Supa Bass  Clinic Number: [id number]  Diagnosis:   Encounter Diagnoses   Name Primary?    Chronic right-sided low back pain with right-sided sciatica Yes    Muscle weakness      Physician: Taqueria Santiago MD  Treatment Orders: PT Eval and Treat  Past Medical History:   Diagnosis Date    GERD (gastroesophageal reflux disease)     Hearing loss     HTN (hypertension)     Hyperlipidemia     RAUL (obstructive sleep apnea)     CPAP at night    PTSD (post-traumatic stress disorder)        Precautions: standard  Evaluation date: 2/5/2019  Visit # authorized: 3/16  Authorization period: 5-30-19  Plan of care expiration: 3-19-19  MD Follow up appt: 5-29-19    Time In:  2:05  Time Out:  2:52  Billable Time:  40 min    Subjective     Pt reports: was doing well and then Friday AM and did stretches and did push/pull and felt a pop and back spasmed and over the day got better but now back to original pain.    Pain Scale: before treatment: 4 hip and down leg currently; 2 and sore into to top of ant thigh after contract relax and push/pull after treatment: 1-2,     Objective     AR R pelvis       TREATMENT  Therapeutic exercise: Supa received therapeutic exercises to develop strength, endurance, ROM, flexibility and core stabilization for 40 minutes including:   Pt with pelvic dysfunction and did not correct with push/pull.  Performed contract-relax to piriformis followed by push/pull which corrected dysfunction.  Pt was able to note a positive change in symptoms. Written instructions were provided to patient to be able to perform at home when push/pull alone does not work.      HS stretch x 10  Piriformis stretch x 10  Bridge x 10  Pelvic tilt x 10  Trunk rotation supine x 10   SLR x 10  Hip abduction sidelying x 10  Hip ext prone x 10     Modified Push/pull x 3       Written Home Exercises Provided: indented ex noted above  Pt demo good understanding of the education  provided. [employer] demonstrated good return demonstration of activities.     Pt. education:  · Posture reeducation, body mechanics, HEP,   · No spiritual or educational barriers to learning provided  · Pt has no cultural, educational or language barriers to learning provided.    Assessment     Pt with set back when push/pull did not work.  Pt with improved symptoms at end of treatment and appears to understand contract relax piriformis  Pt able to progress slightly with SLR, but held further progression until more stabilized    Pt will continue to benefit from skilled outpatient physical therapy to address the remaining functional deficits, provide pt/family education, and to maximize pt's level of independence in the home and community environment. .     GOALS:   Short Term Goals:  3 weeks  Increase range of motion 25%  Increase strength 1/2 muscle grade  Improve postural awareness of pelvis to independently identify dysfunction with min assist from PT  Be able to perform HEP with minimal cueing required     Long Term Goals: 6 weeks  Increase range of motion to 75% to 100% full   Improve muscle strength 1 muscle grade  Improve and stabilize proper pelvic positioning  Restore ability to stand for ADL without increased pain  Restore ability to sit for ADL without increased pain  Restore ability to perform ADL's and household activities independently and without increased pain    Anticipated barriers to physical therapy: none  Pt's spiritual, cultural and educational needs considered and pt agreeable to plan of care and goals        Plan   Continue with established Plan of Care towards PT goals.

## 2019-02-14 ENCOUNTER — CLINICAL SUPPORT (OUTPATIENT)
Dept: REHABILITATION | Facility: HOSPITAL | Age: 49
End: 2019-02-14
Attending: INTERNAL MEDICINE
Payer: OTHER GOVERNMENT

## 2019-02-14 DIAGNOSIS — M54.41 CHRONIC RIGHT-SIDED LOW BACK PAIN WITH RIGHT-SIDED SCIATICA: Primary | ICD-10-CM

## 2019-02-14 DIAGNOSIS — M62.81 MUSCLE WEAKNESS: ICD-10-CM

## 2019-02-14 DIAGNOSIS — G89.29 CHRONIC RIGHT-SIDED LOW BACK PAIN WITH RIGHT-SIDED SCIATICA: Primary | ICD-10-CM

## 2019-02-14 PROCEDURE — 97110 THERAPEUTIC EXERCISES: CPT | Mod: PN | Performed by: PHYSICAL THERAPIST

## 2019-02-14 NOTE — PROGRESS NOTES
Physical Therapy Daily Note     Name: Supa Bass  Clinic Number: [id number]  Diagnosis:   Encounter Diagnoses   Name Primary?    Chronic right-sided low back pain with right-sided sciatica Yes    Muscle weakness      Physician: Taqueria Santiago MD  Treatment Orders: PT Eval and Treat  Past Medical History:   Diagnosis Date    GERD (gastroesophageal reflux disease)     Hearing loss     HTN (hypertension)     Hyperlipidemia     RAUL (obstructive sleep apnea)     CPAP at night    PTSD (post-traumatic stress disorder)        Precautions: standard  Evaluation date: 2/5/2019  Visit # authorized: 4/16  Authorization period: 5-30-19  Plan of care expiration: 3-19-19  MD Follow up appt: 5-29-19    Time In:  2:05  Time Out:  2:50  Billable Time:  40 min    Subjective     Pt reports: feeling better and no more pain down leg.  Pt states he did not have to do contract relax to level pelvis  Pt states woke up feeling great with no pain.  Little soreness, did some push/pull during day, but had not done one since this AM.    Pain Scale: before treatment: 1.5 after push/pull 1/10 after treatment: 0 pain, but feel muscle soreness     Objective     AR R pelvis       TREATMENT  Therapeutic exercise: Supa received therapeutic exercises to develop strength, endurance, ROM, flexibility and core stabilization for 40 minutes including:   Pt with pelvic dysfunction.  Pt had not recently performed push/pull ex.  Pt performed push/pull exercise and able to note positive change in symptoms.  Instructed pt further in increased awareness of symptoms.  Instructed pt again in need to perform push/pull at onset of increased symptoms.      HS stretch x 10  Piriformis stretch x 10  Bridge x 20  Pelvic tilt x 20   partial sit up x 10  Trunk rotation supine x 20    SLR x 20  Hip abduction sidelying x 20  Hip ext prone x 20   Hip ext prone with bent knee x 10     Modified Push/pull x 3       Written Home Exercises Provided: indented  ex noted above  Pt demo good understanding of the education provided. Supa demonstrated good return demonstration of activities.     Pt. education:  · Posture reeducation, body mechanics, HEP,   · No spiritual or educational barriers to learning provided  · Pt has no cultural, educational or language barriers to learning provided.    Assessment   Pt recovered from set back and appears to have better understanding that even slight symptom needs to be addressed with push/pull  Gera progression well, fatigue at end   Pt will continue to benefit from skilled outpatient physical therapy to address the remaining functional deficits, provide pt/family education, and to maximize pt's level of independence in the home and community environment. .     GOALS:   Short Term Goals:  3 weeks  Increase range of motion 25%  Increase strength 1/2 muscle grade  Improve postural awareness of pelvis to independently identify dysfunction with min assist from PT  Be able to perform HEP with minimal cueing required     Long Term Goals: 6 weeks  Increase range of motion to 75% to 100% full   Improve muscle strength 1 muscle grade  Improve and stabilize proper pelvic positioning  Restore ability to stand for ADL without increased pain  Restore ability to sit for ADL without increased pain  Restore ability to perform ADL's and household activities independently and without increased pain    Anticipated barriers to physical therapy: none  Pt's spiritual, cultural and educational needs considered and pt agreeable to plan of care and goals        Plan   Continue with established Plan of Care towards PT goals.

## 2019-02-19 ENCOUNTER — CLINICAL SUPPORT (OUTPATIENT)
Dept: REHABILITATION | Facility: HOSPITAL | Age: 49
End: 2019-02-19
Attending: INTERNAL MEDICINE
Payer: OTHER GOVERNMENT

## 2019-02-19 DIAGNOSIS — M54.41 CHRONIC RIGHT-SIDED LOW BACK PAIN WITH RIGHT-SIDED SCIATICA: Primary | ICD-10-CM

## 2019-02-19 DIAGNOSIS — M62.81 MUSCLE WEAKNESS: ICD-10-CM

## 2019-02-19 DIAGNOSIS — G89.29 CHRONIC RIGHT-SIDED LOW BACK PAIN WITH RIGHT-SIDED SCIATICA: Primary | ICD-10-CM

## 2019-02-19 PROCEDURE — 97110 THERAPEUTIC EXERCISES: CPT | Mod: PN | Performed by: PHYSICAL THERAPIST

## 2019-02-19 NOTE — PROGRESS NOTES
Physical Therapy Daily Note     Name: Supa Bass  Clinic Number: [id number]  Diagnosis:   Encounter Diagnoses   Name Primary?    Chronic right-sided low back pain with right-sided sciatica Yes    Muscle weakness      Physician: Taqueria Santiago MD  Treatment Orders: PT Eval and Treat  Past Medical History:   Diagnosis Date    GERD (gastroesophageal reflux disease)     Hearing loss     HTN (hypertension)     Hyperlipidemia     RAUL (obstructive sleep apnea)     CPAP at night    PTSD (post-traumatic stress disorder)        Precautions: standard  Evaluation date: 2/5/2019  Visit # authorized: 5/16  Authorization period: 5-30-19  Plan of care expiration: 3-19-19  MD Follow up appt: 5-29-19    Time In:  2:05  Time Out:  2:50  Billable Time:  30 min    Subjective     Pt reports:was going good until 2 days ago.  Pt states push/pull would not work so did contract relax and push/pull help a little but did not stay in .  Pt states last night cramping in back Pt states took flexoril and better today   Pain Scale: before treatment: 3 after push/pull 1/10 after treatment: 1 pain, but feel much better     Objective     AR R pelvis       TREATMENT  Therapeutic exercise: Supa received therapeutic exercises to develop strength, endurance, ROM, flexibility and core stabilization for 30 minutes including:   Pt with pelvic dysfunction.  Pt had not recently performed push/pull ex.  Pt performed push/pull exercise and able to note positive change in symptoms.  Instructed pt further in increased awareness of symptoms.  Instructed pt again in need to perform push/pull at onset of increased symptoms.      Went in dysfunction easily and tried sidelying mob and still muscle tightness noted R paraspinals and QL   Performed press up and felt relief of symptoms    Modified ex program as noted below  Instructed pt in flexion/extension principles and need to focus on extension principles. Instructed pt in spinal anatomy and  maintaining lumbar lordosis with ADL       HS stretch x 10 with towel roll under back to support lordosis  Piriformis stretch x 10 with towel roll under back to support lordosis    Bridge x 20  (NP)Pelvic tilt x 20  (NP)partial sit up x 20  (NP)Trunk rotation supine x 20     (NP)SLR x 20  Hip abduction sidelying x 20  Hip ext prone x 20  Hip ext prone with bent knee x 10     Modified Push/pull x 3  Press up x 10       Written Home Exercises Provided:  ex noted above that were performed so pt knows what to do and not do  Pt demo good understanding of the education provided. Supa demonstrated good return demonstration of activities.     Pt. education:  · Posture reeducation, body mechanics, HEP,   · No spiritual or educational barriers to learning provided  · Pt has no cultural, educational or language barriers to learning provided.    Assessment   Pt with another set back and appears to benefit from extension principles and appears to understand extension principles and continue with push/pull as needed   Pt will continue to benefit from skilled outpatient physical therapy to address the remaining functional deficits, provide pt/family education, and to maximize pt's level of independence in the home and community environment. .     GOALS:   Short Term Goals:  3 weeks  Increase range of motion 25%  Increase strength 1/2 muscle grade  Improve postural awareness of pelvis to independently identify dysfunction with min assist from PT  Be able to perform HEP with minimal cueing required     Long Term Goals: 6 weeks  Increase range of motion to 75% to 100% full   Improve muscle strength 1 muscle grade  Improve and stabilize proper pelvic positioning  Restore ability to stand for ADL without increased pain  Restore ability to sit for ADL without increased pain  Restore ability to perform ADL's and household activities independently and without increased pain    Anticipated barriers to physical therapy: none  Pt's  spiritual, cultural and educational needs considered and pt agreeable to plan of care and goals        Plan   Continue with established Plan of Care towards PT goals.

## 2019-02-21 ENCOUNTER — CLINICAL SUPPORT (OUTPATIENT)
Dept: REHABILITATION | Facility: HOSPITAL | Age: 49
End: 2019-02-21
Attending: INTERNAL MEDICINE
Payer: OTHER GOVERNMENT

## 2019-02-21 DIAGNOSIS — G89.29 CHRONIC RIGHT-SIDED LOW BACK PAIN WITH RIGHT-SIDED SCIATICA: Primary | ICD-10-CM

## 2019-02-21 DIAGNOSIS — M54.41 CHRONIC RIGHT-SIDED LOW BACK PAIN WITH RIGHT-SIDED SCIATICA: Primary | ICD-10-CM

## 2019-02-21 DIAGNOSIS — M62.81 MUSCLE WEAKNESS: ICD-10-CM

## 2019-02-21 PROCEDURE — 97110 THERAPEUTIC EXERCISES: CPT | Mod: PN | Performed by: PHYSICAL THERAPIST

## 2019-02-21 PROCEDURE — 97140 MANUAL THERAPY 1/> REGIONS: CPT | Mod: PN | Performed by: PHYSICAL THERAPIST

## 2019-02-21 NOTE — PROGRESS NOTES
Physical Therapy Daily Note     Name: Suap Bass  Clinic Number: [id number]  Diagnosis:   Encounter Diagnoses   Name Primary?    Chronic right-sided low back pain with right-sided sciatica Yes    Muscle weakness      Physician: Taqueria Santiago MD  Treatment Orders: PT Eval and Treat  Past Medical History:   Diagnosis Date    GERD (gastroesophageal reflux disease)     Hearing loss     HTN (hypertension)     Hyperlipidemia     RAUL (obstructive sleep apnea)     CPAP at night    PTSD (post-traumatic stress disorder)        Precautions: standard  Evaluation date: 2/5/2019  Visit # authorized: 6/16  Authorization period: 5-30-19  Plan of care expiration: 3-19-19  MD Follow up appt: 5-29-19    Time In:  1:58  Time Out:  2:50  Billable Time:  40 min    Subjective     Pt reports:have been better since last visit.  LBP has subsided.  Pt states hip pain was 0 yesterday.  Pt states some hip pain upon awakening did push/pull and improved.  Pt had sat to do some computer work for a couple of hours but did not sit properly in chair and had pain after getting up.     Pain Scale: before treatment: 2-3 after push/pull 1-2/10 after treatment: 0.5 hip pain, but feel much better     Objective     Slight AR R pelvis       TREATMENT  Therapeutic exercise: Supa received therapeutic exercises to develop strength, endurance, ROM, flexibility and core stabilization for 30 minutes including:   Performed push/pull able to note + change in symptoms.  During clinic pt bent over to retrieve item from floor and correct proper body mechanics with picking up items from floor.  Instructed in proper sitting posture.      HS stretch x 10 with towel roll under back to support lordosis  Piriformis stretch x 10 with towel roll under back to support lordosis    Bridge x 20  (NP)Pelvic tilt x 20  (NP)partial sit up x 20  (NP)Trunk rotation supine x 20      SLR x 10  Hip abduction sidelying x 20  Hip ext prone x 20   Arm lift prone x  10  Hip ext prone with bent knee x 10     Modified Push/pull x 3  Press up x 10    Manual therapy techniques were performed to improve soft tissue mobility, decrease muscle tightness and pain to R gluteals/piriformis for 10 minutes.  Decreased muscle tightness and pain after treatment            Written Home Exercises Provided:  Indented ex noted above  Pt demo good understanding of the education provided. Supa demonstrated good return demonstration of activities.     Pt. education:  · Posture reeducation, body mechanics, HEP,   · No spiritual or educational barriers to learning provided  · Pt has no cultural, educational or language barriers to learning provided.    Assessment   Pt recovering from previous set back and has better understanding of need for proper support with sitting  Also working on good posture with retrieving items from ground Gera progression well   Pt will continue to benefit from skilled outpatient physical therapy to address the remaining functional deficits, provide pt/family education, and to maximize pt's level of independence in the home and community environment. .     GOALS:   Short Term Goals:  3 weeks  Increase range of motion 25%  Increase strength 1/2 muscle grade  Improve postural awareness of pelvis to independently identify dysfunction with min assist from PT  Be able to perform HEP with minimal cueing required     Long Term Goals: 6 weeks  Increase range of motion to 75% to 100% full   Improve muscle strength 1 muscle grade  Improve and stabilize proper pelvic positioning  Restore ability to stand for ADL without increased pain  Restore ability to sit for ADL without increased pain  Restore ability to perform ADL's and household activities independently and without increased pain    Anticipated barriers to physical therapy: none  Pt's spiritual, cultural and educational needs considered and pt agreeable to plan of care and goals        Plan   Continue with established Plan  of Care towards PT goals.

## 2019-02-26 ENCOUNTER — CLINICAL SUPPORT (OUTPATIENT)
Dept: REHABILITATION | Facility: HOSPITAL | Age: 49
End: 2019-02-26
Attending: INTERNAL MEDICINE
Payer: OTHER GOVERNMENT

## 2019-02-26 DIAGNOSIS — M62.81 MUSCLE WEAKNESS: ICD-10-CM

## 2019-02-26 DIAGNOSIS — G89.29 CHRONIC RIGHT-SIDED LOW BACK PAIN WITH RIGHT-SIDED SCIATICA: Primary | ICD-10-CM

## 2019-02-26 DIAGNOSIS — M54.41 CHRONIC RIGHT-SIDED LOW BACK PAIN WITH RIGHT-SIDED SCIATICA: Primary | ICD-10-CM

## 2019-02-26 PROCEDURE — 97140 MANUAL THERAPY 1/> REGIONS: CPT | Mod: PN | Performed by: PHYSICAL THERAPIST

## 2019-02-26 PROCEDURE — 97110 THERAPEUTIC EXERCISES: CPT | Mod: PN | Performed by: PHYSICAL THERAPIST

## 2019-02-26 NOTE — PROGRESS NOTES
Physical Therapy Daily Note     Name: Supa Bass  Clinic Number: [id number]  Diagnosis:   Encounter Diagnoses   Name Primary?    Chronic right-sided low back pain with right-sided sciatica Yes    Muscle weakness      Physician: Taqueria Santiago MD  Treatment Orders: PT Eval and Treat  Past Medical History:   Diagnosis Date    GERD (gastroesophageal reflux disease)     Hearing loss     HTN (hypertension)     Hyperlipidemia     RAUL (obstructive sleep apnea)     CPAP at night    PTSD (post-traumatic stress disorder)        Precautions: standard  Evaluation date: 2/5/2019  Visit # authorized: 7/16  Authorization period: 5-30-19  Plan of care expiration: 3-19-19  MD Follow up appt: 5-29-19    Time In:  2:02  Time Out:  2:45  Billable Time:  40 min    Subjective     Pt reports:been doing better.  This was first weekend did not want to hurry up and get to PT Pt was able to self mobilize.  Pt states at worst 2/10 over weekend and Mon and Tues have been good.  Pt states he last did ex last night before bed.  This AM too busy and did not have time, but did not have pain either  Pt states he has not been having hip pain, only had LBP  Pt states he was getting leaves out of pool and twisted and felt pain.  Pt states press up helped relieve pain    Pain Scale: before treatment: 0  after treatment:0    Objective     Level pelvis to start  After ex mod tightness R piriformis and glut med    TREATMENT  Therapeutic exercise: Supa received therapeutic exercises to develop strength, endurance, ROM, flexibility and core stabilization for 30 minutes including:     Reiterated modified push/pull and press up every 2 hours as possible   .    HS stretch x 10 with towel roll under back to support lordosis  Piriformis stretch x 10 with towel roll under back to support lordosis    Bridge x 20  (NP)Pelvic tilt x 20  (NP)partial sit up x 20  (NP)Trunk rotation supine x 20   SLR x 20  Hip abduction sidelying x 20, needed  push/pull after this ex     Arm and leg lift prone x 10 did 10 on one side and then the other side  Hip ext prone with bent knee x 2/10     Modified Push/pull x 3  Press up x 10     Hip abd standing on board x 10   Hip ext standing on board x 10   Modified push/pull x 3  Press up x 10    Manual therapy techniques were performed to improve soft tissue mobility, decrease muscle tightness and pain to R gluteals/piriformis focus on glut med for 10 minutes.  Decreased muscle tightness and pain after treatment        Written Home Exercises Provided:  Indented ex noted above  Pt demo good understanding of the education provided. Supa demonstrated good return demonstration of activities.     Pt. education:  · Posture reeducation, body mechanics, HEP,   · No spiritual or educational barriers to learning provided  · Pt has no cultural, educational or language barriers to learning provided.    Assessment   Pt with improvement in symptoms and has been able to self mobilize and manage symptoms independently   Gera progression well still needs further strengthening fatigue with ex   Pt will continue to benefit from skilled outpatient physical therapy to address the remaining functional deficits, provide pt/family education, and to maximize pt's level of independence in the home and community environment. .     GOALS:   Short Term Goals:  3 weeks  Increase range of motion 25%  Increase strength 1/2 muscle grade  Improve postural awareness of pelvis to independently identify dysfunction with min assist from PT  Be able to perform HEP with minimal cueing required     Long Term Goals: 6 weeks  Increase range of motion to 75% to 100% full   Improve muscle strength 1 muscle grade  Improve and stabilize proper pelvic positioning  Restore ability to stand for ADL without increased pain  Restore ability to sit for ADL without increased pain  Restore ability to perform ADL's and household activities independently and without  increased pain    Anticipated barriers to physical therapy: none  Pt's spiritual, cultural and educational needs considered and pt agreeable to plan of care and goals        Plan   Continue with established Plan of Care towards PT goals.

## 2019-03-07 ENCOUNTER — CLINICAL SUPPORT (OUTPATIENT)
Dept: REHABILITATION | Facility: HOSPITAL | Age: 49
End: 2019-03-07
Attending: INTERNAL MEDICINE
Payer: OTHER GOVERNMENT

## 2019-03-07 DIAGNOSIS — M62.81 MUSCLE WEAKNESS: ICD-10-CM

## 2019-03-07 DIAGNOSIS — G89.29 CHRONIC RIGHT-SIDED LOW BACK PAIN WITH RIGHT-SIDED SCIATICA: Primary | ICD-10-CM

## 2019-03-07 DIAGNOSIS — M54.41 CHRONIC RIGHT-SIDED LOW BACK PAIN WITH RIGHT-SIDED SCIATICA: Primary | ICD-10-CM

## 2019-03-07 PROCEDURE — 97110 THERAPEUTIC EXERCISES: CPT | Mod: PN | Performed by: PHYSICAL THERAPIST

## 2019-03-07 PROCEDURE — 97014 ELECTRIC STIMULATION THERAPY: CPT | Mod: PN | Performed by: PHYSICAL THERAPIST

## 2019-03-07 PROCEDURE — 97035 APP MDLTY 1+ULTRASOUND EA 15: CPT | Mod: PN | Performed by: PHYSICAL THERAPIST

## 2019-03-07 NOTE — PROGRESS NOTES
Physical Therapy Daily Note     Name: Supa Bass  Clinic Number: [id number]  Diagnosis:   Encounter Diagnoses   Name Primary?    Chronic right-sided low back pain with right-sided sciatica Yes    Muscle weakness      Physician: Taqueria Santiago MD  Treatment Orders: PT Eval and Treat  Past Medical History:   Diagnosis Date    GERD (gastroesophageal reflux disease)     Hearing loss     HTN (hypertension)     Hyperlipidemia     RAUL (obstructive sleep apnea)     CPAP at night    PTSD (post-traumatic stress disorder)        Precautions: standard  Evaluation date: 2/5/2019  Visit # authorized: 8/16  Authorization period: 5-30-19  Plan of care expiration: 3-19-19  MD Follow up appt: 5-29-19    Time In:  4:05  Time Out:  5:20  Billable Time:  58 min    Subjective     Pt reports:walked 6 mile parade Friday night and hip and LB R with pain since then.push/pull and press up have helped, but still some pain     Pain Scale: before treatment: 3  after push/pull 1-2 after US 1 after treatment:0    Objective   Pelvic dysfunction to start    TREATMENT  Therapeutic exercise: Supa received therapeutic exercises to develop strength, endurance, ROM, flexibility and core stabilization for 30 minutes including:     Pt with pelvic dysfunction. Pt performed push/pull exercise and able to note positive change in symptoms.  Instructed pt further in increased awareness of symptoms.  Instructed pt again in need to perform push/pull at onset of increased symptoms.      Instructed pt to continue work on push/pull and extension principles.  Discussed further stretching for back as needed, but due to that leading to flexion principles will hold for now   .    HS stretch x 10 with towel roll under back to support lordosis  Piriformis stretch x 10 with towel roll under back to support lordosis    Bridge x 10  (NP)Pelvic tilt x 20  (NP)partial sit up x 20  Trunk rotation supine x 10   SLR x 20  Hip abduction sidelying x 20,  needed push/pull after this ex  Arm and leg lift prone x 10 did 10 on one side and then the other side  Hip ext prone with bent knee x 10     Modified Push/pull x 3  Press up x 10    Hip abd standing on board x 10  (NP)Hip ext standing on board x 10   Modified push/pull x 3  Press up x 10    Ultrasound  for pain control and to decrease inflammation @ continuous duty cycle, 1 Mhz, applied to R LB and R SI , intensity = 1.6 w/cm2 for 8 minutes.      Manual therapy techniques were performed to improve soft tissue mobility, decrease muscle tightness and pain to R gluteals/piriformis focus on glut med for 5 minutes.  Decreased muscle tightness and pain after treatment        IFC electrical stimulation for pain control and to decrease muscle tightness and was applied to R QL and R SI region with small pads,   frequency = ,  @ 40% scan,  for 15 minutes with .      Written Home Exercises Provided:  Indented ex noted above  Pt demo good understanding of the education provided. Henriqueer demonstrated good return demonstration of activities.     Pt. education:  · Posture reeducation, body mechanics, HEP,   · No spiritual or educational barriers to learning provided  · Pt has no cultural, educational or language barriers to learning provided.    Assessment   Pt aggravated back with long walk, but recovering and US led to further improvement in symptoms.  Pt able to self mobilize with push/pull  Held further progression due to flare up Modified slightly with decreased reps on some ex to avoid increased irritation. Increased pain and tightness after hip abd standing on board, so instructed pt to hold for now.  Pt with improved symptoms to 0 after ES with    Pt will continue to benefit from skilled outpatient physical therapy to address the remaining functional deficits, provide pt/family education, and to maximize pt's level of independence in the home and community environment. .     GOALS:   Short Term Goals:  3  weeks  Increase range of motion 25%  Increase strength 1/2 muscle grade  Improve postural awareness of pelvis to independently identify dysfunction with min assist from PT  Be able to perform HEP with minimal cueing required     Long Term Goals: 6 weeks  Increase range of motion to 75% to 100% full   Improve muscle strength 1 muscle grade  Improve and stabilize proper pelvic positioning  Restore ability to stand for ADL without increased pain  Restore ability to sit for ADL without increased pain  Restore ability to perform ADL's and household activities independently and without increased pain    Anticipated barriers to physical therapy: none  Pt's spiritual, cultural and educational needs considered and pt agreeable to plan of care and goals        Plan   Continue with established Plan of Care towards PT goals.

## 2019-03-11 ENCOUNTER — CLINICAL SUPPORT (OUTPATIENT)
Dept: REHABILITATION | Facility: HOSPITAL | Age: 49
End: 2019-03-11
Attending: INTERNAL MEDICINE
Payer: OTHER GOVERNMENT

## 2019-03-11 DIAGNOSIS — M62.81 MUSCLE WEAKNESS: ICD-10-CM

## 2019-03-11 DIAGNOSIS — G89.29 CHRONIC RIGHT-SIDED LOW BACK PAIN WITH RIGHT-SIDED SCIATICA: Primary | ICD-10-CM

## 2019-03-11 DIAGNOSIS — M54.41 CHRONIC RIGHT-SIDED LOW BACK PAIN WITH RIGHT-SIDED SCIATICA: Primary | ICD-10-CM

## 2019-03-11 PROCEDURE — 97110 THERAPEUTIC EXERCISES: CPT | Mod: PN | Performed by: PHYSICAL THERAPIST

## 2019-03-11 PROCEDURE — 97014 ELECTRIC STIMULATION THERAPY: CPT | Mod: PN | Performed by: PHYSICAL THERAPIST

## 2019-03-11 PROCEDURE — 97140 MANUAL THERAPY 1/> REGIONS: CPT | Mod: PN | Performed by: PHYSICAL THERAPIST

## 2019-03-11 NOTE — PROGRESS NOTES
Physical Therapy Daily Note     Name: Supa Bass  Clinic Number: [id number]  Diagnosis:   Encounter Diagnoses   Name Primary?    Chronic right-sided low back pain with right-sided sciatica Yes    Muscle weakness      Physician: Taqueria Santiago MD  Treatment Orders: PT Eval and Treat  Past Medical History:   Diagnosis Date    GERD (gastroesophageal reflux disease)     Hearing loss     HTN (hypertension)     Hyperlipidemia     RAUL (obstructive sleep apnea)     CPAP at night    PTSD (post-traumatic stress disorder)        Precautions: standard  Evaluation date: 2/5/2019  Visit # authorized: 9/16  Authorization period: 5-30-19  Plan of care expiration: 3-19-19  MD Follow up appt: 5-29-19    Time In:  1:05  Time Out:  2:05  Billable Time:  55 min    Subjective     Pt reports: feeling better, more restful weekend.  Used TENS unit a couple of times as soon as started hurting some and would help so better able to keep pain to 1-2.  Pt states he finds he was able to avoid pain for prolonged period which brings on muscle spasms.  Pt  States he last push/pulled last night  Pt was able to carry 40# bag of salt into pool.  He was careful, but able to function over the weekend.       Pain Scale: before treatment: 0  after treatment:0    Objective   Level pelvis to start    TREATMENT  Therapeutic exercise: Supa received therapeutic exercises to develop strength, endurance, ROM, flexibility and core stabilization for 30 minutes including:   .    HS stretch x 10 with towel roll under back to support lordosis Held towel roll today  Piriformis stretch x 10 with towel roll under back to support lordosis Held towel roll today    Bridge x 10  (NP)Pelvic tilt x 20  (NP)partial sit up x 20  Trunk rotation supine x 20   SLR x 20  Hip abduction sidelying x 20, needed push/pull after this ex  Arm and leg lift prone x 15   Hip ext prone with bent knee x 10     Modified Push/pull x 3  Press up x 10         Shoulder ext  with BTB x 10  Hip abd standing on board x 5 led to dysfunction so instructed pt to hold at home for now  (NP)Hip ext standing on board x 10   Modified push/pull x 3  Press up x 10    Provided BTB for home use    (NP)Ultrasound  for pain control and to decrease inflammation @ continuous duty cycle, 1 Mhz, applied to R LB and R SI , intensity = 1.6 w/cm2 for 8 minutes.      Manual therapy techniques were performed to improve soft tissue mobility, decrease muscle tightness and pain for 10 minutes.  STM R lumbar paraspinals and QL Decreased muscle tightness and pain after treatment     Vacuum/cupping STM with manual therapy techniques was performed to R LB and QL into R glut med to decrease muscle tightness, increase circulation and promote healing process.  The pt's skin was monitored for redness adjusting pressure as needed. The pt was instructed in possible side effects of bruising and/or soreness.         IFC electrical stimulation for pain control and to decrease muscle tightness and was applied to R QL and R SI region with small pads,   frequency = ,  @ 40% scan,  for 15 minutes with .      Written Home Exercises Provided:  Indented ex noted above  Pt demo good understanding of the education provided. Supa demonstrated good return demonstration of activities.     Pt. education:  · Posture reeducation, body mechanics, HEP,   · No spiritual or educational barriers to learning provided  · Pt has no cultural, educational or language barriers to learning provided.    Assessment   Pt with improved symptoms overall and maintained level pelvis since last night  Pt may benefit from additional STM and to continue with home TENS to manage symptoms   Gera progression well   Pt will continue to benefit from skilled outpatient physical therapy to address the remaining functional deficits, provide pt/family education, and to maximize pt's level of independence in the home and community environment. .     GOALS:    Short Term Goals:  3 weeks  Increase range of motion 25%  Increase strength 1/2 muscle grade  Improve postural awareness of pelvis to independently identify dysfunction with min assist from PT  Be able to perform HEP with minimal cueing required     Long Term Goals: 6 weeks  Increase range of motion to 75% to 100% full   Improve muscle strength 1 muscle grade  Improve and stabilize proper pelvic positioning  Restore ability to stand for ADL without increased pain  Restore ability to sit for ADL without increased pain  Restore ability to perform ADL's and household activities independently and without increased pain    Anticipated barriers to physical therapy: none  Pt's spiritual, cultural and educational needs considered and pt agreeable to plan of care and goals        Plan   Continue with established Plan of Care towards PT goals.

## 2019-03-13 ENCOUNTER — CLINICAL SUPPORT (OUTPATIENT)
Dept: REHABILITATION | Facility: HOSPITAL | Age: 49
End: 2019-03-13
Attending: INTERNAL MEDICINE
Payer: OTHER GOVERNMENT

## 2019-03-13 DIAGNOSIS — M62.81 MUSCLE WEAKNESS: ICD-10-CM

## 2019-03-13 DIAGNOSIS — M54.41 CHRONIC RIGHT-SIDED LOW BACK PAIN WITH RIGHT-SIDED SCIATICA: Primary | ICD-10-CM

## 2019-03-13 DIAGNOSIS — G89.29 CHRONIC RIGHT-SIDED LOW BACK PAIN WITH RIGHT-SIDED SCIATICA: Primary | ICD-10-CM

## 2019-03-13 PROCEDURE — 97035 APP MDLTY 1+ULTRASOUND EA 15: CPT | Mod: PN | Performed by: PHYSICAL THERAPIST

## 2019-03-13 PROCEDURE — 97140 MANUAL THERAPY 1/> REGIONS: CPT | Mod: PN | Performed by: PHYSICAL THERAPIST

## 2019-03-13 PROCEDURE — 97110 THERAPEUTIC EXERCISES: CPT | Mod: PN | Performed by: PHYSICAL THERAPIST

## 2019-03-13 NOTE — PROGRESS NOTES
Physical Therapy Daily Note     Name: Supa Bass  Clinic Number: [id number]  Diagnosis:   Encounter Diagnoses   Name Primary?    Chronic right-sided low back pain with right-sided sciatica Yes    Muscle weakness      Physician: Taqueria Santiago MD  Treatment Orders: PT Eval and Treat  Past Medical History:   Diagnosis Date    GERD (gastroesophageal reflux disease)     Hearing loss     HTN (hypertension)     Hyperlipidemia     RAUL (obstructive sleep apnea)     CPAP at night    PTSD (post-traumatic stress disorder)        Precautions: standard  Evaluation date: 2/5/2019  Visit # authorized: 9/16  Authorization period: 5-30-19  Plan of care expiration: 3-19-19  MD Follow up appt: 5-29-19    Time In:  9:03  Time Out:  10:00  Billable Time:  43 min    Subjective     Pt reports: has continued with TENS continue with some tightness but unable to get full relief despite ex and staying on top of need for push/pull     Pain Scale: before treatment: 2-3 tightness pain level 1  after treatment:0    Objective   Slight dysfunction to start    TREATMENT  Therapeutic exercise: Supa received therapeutic exercises to develop strength, endurance, ROM, flexibility and core stabilization for 20 minutes including:     Did push/pull and no change  .    HS stretch x 10 with towel roll under back to support lordosis Held towel roll today  Piriformis stretch x 10 with towel roll under back to support lordosis Held towel roll today   Glut med stretch x10  After HS stretch done at end feel release then tightens up again  Did bridge and then tightened more then push/pull and press up and a little better  Tight feeling is 1    Bridge x 10  (NP)Pelvic tilt x 20  (NP)partial sit up x 20  Trunk rotation supine x 20   SLR x 20  Hip abduction sidelying x 20, needed push/pull after this ex  Arm and leg lift prone x 15   Hip ext prone with bent knee x 10     Modified Push/pull x 3  Press up x 10         Shoulder ext with BTB x  10  Hip abd standing on board x 5 led to dysfunction so instructed pt to hold at home for now  (NP)Hip ext standing on board x 10   Modified push/pull x 3  Press up x 10    Provided BTB for home use    Ultrasound  for pain control and to decrease inflammation @ continuous duty cycle, 1 Mhz, applied to R sacral border , intensity = 1.8 w/cm2 for 8 minutes.      Manual therapy techniques were performed to improve soft tissue mobility, decrease muscle tightness and pain for 15 minutes.  STM R lumbar paraspinals and QL and piriformis and gluts   Pt received tool-assisted massage with manual therapy techniques to gluts and lumbar paraspinals R to trigger an inflammatory healing response and stimulate the production of new collagen and proper, more functional, less painful healing.      Vacuum/cupping STM with manual therapy techniques was performed to R LB and QL into R glut med and gluts to greater trochanter  to decrease muscle tightness, increase circulation and promote healing process.  The pt's skin was monitored for redness adjusting pressure as needed. The pt was instructed in possible side effects of bruising and/or soreness.        (NP) IFC electrical stimulation for pain control and to decrease muscle tightness and was applied to R QL and R SI region with small pads,   frequency = ,  @ 40% scan,  for 15 minutes with .      Written Home Exercises Provided:  Indented ex noted above  Pt demo good understanding of the education provided. Supa demonstrated good return demonstration of activities.     Pt. education:  · Posture reeducation, body mechanics, HEP,   · No spiritual or educational barriers to learning provided  · Pt has no cultural, educational or language barriers to learning provided.    Assessment   Pt with continued tightness in hip and performed manual therapy to hip and will note response, consider taping to this area.     Pt will continue to benefit from skilled outpatient physical  therapy to address the remaining functional deficits, provide pt/family education, and to maximize pt's level of independence in the home and community environment. .     GOALS:   Short Term Goals:  3 weeks  Increase range of motion 25%  Increase strength 1/2 muscle grade  Improve postural awareness of pelvis to independently identify dysfunction with min assist from PT  Be able to perform HEP with minimal cueing required     Long Term Goals: 6 weeks  Increase range of motion to 75% to 100% full   Improve muscle strength 1 muscle grade  Improve and stabilize proper pelvic positioning  Restore ability to stand for ADL without increased pain  Restore ability to sit for ADL without increased pain  Restore ability to perform ADL's and household activities independently and without increased pain    Anticipated barriers to physical therapy: none  Pt's spiritual, cultural and educational needs considered and pt agreeable to plan of care and goals        Plan   Continue with established Plan of Care towards PT goals.

## 2019-03-18 ENCOUNTER — CLINICAL SUPPORT (OUTPATIENT)
Dept: REHABILITATION | Facility: HOSPITAL | Age: 49
End: 2019-03-18
Attending: INTERNAL MEDICINE
Payer: OTHER GOVERNMENT

## 2019-03-18 DIAGNOSIS — M62.81 MUSCLE WEAKNESS: ICD-10-CM

## 2019-03-18 DIAGNOSIS — G89.29 CHRONIC RIGHT-SIDED LOW BACK PAIN WITH RIGHT-SIDED SCIATICA: Primary | ICD-10-CM

## 2019-03-18 DIAGNOSIS — M54.41 CHRONIC RIGHT-SIDED LOW BACK PAIN WITH RIGHT-SIDED SCIATICA: Primary | ICD-10-CM

## 2019-03-18 PROCEDURE — 97110 THERAPEUTIC EXERCISES: CPT | Mod: PN | Performed by: PHYSICAL THERAPIST

## 2019-03-18 PROCEDURE — 97140 MANUAL THERAPY 1/> REGIONS: CPT | Mod: PN | Performed by: PHYSICAL THERAPIST

## 2019-03-18 PROCEDURE — 97035 APP MDLTY 1+ULTRASOUND EA 15: CPT | Mod: PN | Performed by: PHYSICAL THERAPIST

## 2019-03-18 NOTE — PROGRESS NOTES
Physical Therapy Daily Note     Name: Supa Bass  Clinic Number: [id number]  Diagnosis:   Encounter Diagnoses   Name Primary?    Chronic right-sided low back pain with right-sided sciatica Yes    Muscle weakness      Physician: Taqueria Santiago MD  Treatment Orders: PT Eval and Treat  Past Medical History:   Diagnosis Date    GERD (gastroesophageal reflux disease)     Hearing loss     HTN (hypertension)     Hyperlipidemia     RAUL (obstructive sleep apnea)     CPAP at night    PTSD (post-traumatic stress disorder)        Precautions: standard  Evaluation date: 2/5/2019  Visit # authorized: 9/16  Authorization period: 5-30-19  Plan of care expiration: 3-19-19  MD Follow up appt: 5-29-19    Time In:  12:03  Time Out:  1:00  Billable Time:  53 min    Subjective     Pt reports: this weekend was good Pt states Sunday just regular day nothing out of ordinary.  During the night last night felt muscles in R LB and hip tightening a little.  Does not hurt just feels like it could go in spasm if make wrong move so has been more cautious today and did not do ex for fear of going into spasm  He feels last treatment helped      Pain Scale: before treatment: 0 pain tight feeling is 0.5   after treatment:0    Objective   Slight dysfunction to start  Mod tightness lumbar paraspinals into lumbar region    TREATMENT  Therapeutic exercise: Supa received therapeutic exercises to develop strength, endurance, ROM, flexibility and core stabilization for 30 minutes including:     Did push/pull and no change  .    HS stretch x 10   Piriformis stretch x 10   Glut med stretch x10    Did bridge and then tightened more then push/pull and press up and a little better  Tight feeling is 1    Bridge x 10  (NP)Pelvic tilt x 20  (NP)partial sit up x 20  Trunk rotation supine x 20   SLR x 20   Hip abd supine x 10  (NP)Hip abduction sidelying x 20, needed push/pull after this ex  (NP)Arm and leg lift prone x 15   (NP)Hip ext prone  with bent knee x 10     Modified Push/pull x 3  Press up x 10        Shoulder ext with BTB x 10  (NP)Hip abd standing on board x 5 led to dysfunction so instructed pt to hold at home for now  (NP)Hip ext standing on board x 10   Modified push/pull x 3  Press up x 10        Ultrasound  for pain control and to decrease inflammation @ continuous duty cycle, 1 Mhz, applied to R sacral border , intensity = 1.8 w/cm2 for 8 minutes.      Manual therapy techniques were performed to improve soft tissue mobility, decrease muscle tightness and pain for 15 minutes.  STM R lumbar paraspinals and QL and piriformis and gluts   Pt received tool-assisted massage with manual therapy techniques to gluts and lumbar paraspinals R to trigger an inflammatory healing response and stimulate the production of new collagen and proper, more functional, less painful healing.      Vacuum/cupping STM with manual therapy techniques was performed to R LB and QL into R glut med and gluts to greater trochanter  to decrease muscle tightness, increase circulation and promote healing process.  The pt's skin was monitored for redness adjusting pressure as needed. The pt was instructed in possible side effects of bruising and/or soreness.        (NP) IFC electrical stimulation for pain control and to decrease muscle tightness and was applied to R QL and R SI region with small pads,   frequency = ,  @ 40% scan,  for 15 minutes with .      Written Home Exercises Provided:  Indented ex noted above  Pt demo good understanding of the education provided. Supa demonstrated good return demonstration of activities.     Pt. education:  · Posture reeducation, body mechanics, HEP,   · No spiritual or educational barriers to learning provided  · Pt has no cultural, educational or language barriers to learning provided.    Assessment     Pt with feeling of tightness, but no pain and improved a little after ex anf further after manual therapy and US>  Pt  has home TEN unit and will work on trying to self manage symptoms pt will return as needed and will follow up in 2 weeks for final assessment  Did not want to put pt through full rigor of assessment today due to symptoms which could be aggravated with evaluation Pt understands to come in sooner if problem arises.       Pt will continue to benefit from skilled outpatient physical therapy to address the remaining functional deficits, provide pt/family education, and to maximize pt's level of independence in the home and community environment. .     GOALS:   Short Term Goals:  3 weeks  Increase range of motion 25%  Increase strength 1/2 muscle grade  Improve postural awareness of pelvis to independently identify dysfunction with min assist from PT  Be able to perform HEP with minimal cueing required     Long Term Goals: 6 weeks  Increase range of motion to 75% to 100% full   Improve muscle strength 1 muscle grade  Improve and stabilize proper pelvic positioning  Restore ability to stand for ADL without increased pain  Restore ability to sit for ADL without increased pain  Restore ability to perform ADL's and household activities independently and without increased pain    Anticipated barriers to physical therapy: none  Pt's spiritual, cultural and educational needs considered and pt agreeable to plan of care and goals        Plan   Continue with established Plan of Care towards PT goals.  Reassess in 2 weeks or before if a problem arises.

## 2019-04-29 ENCOUNTER — TELEPHONE (OUTPATIENT)
Dept: REHABILITATION | Facility: HOSPITAL | Age: 49
End: 2019-04-29

## 2019-04-30 ENCOUNTER — DOCUMENTATION ONLY (OUTPATIENT)
Dept: REHABILITATION | Facility: HOSPITAL | Age: 49
End: 2019-04-30

## 2019-04-30 DIAGNOSIS — M62.81 MUSCLE WEAKNESS: ICD-10-CM

## 2019-04-30 DIAGNOSIS — G89.29 CHRONIC RIGHT-SIDED LOW BACK PAIN WITH RIGHT-SIDED SCIATICA: Primary | ICD-10-CM

## 2019-04-30 DIAGNOSIS — M54.41 CHRONIC RIGHT-SIDED LOW BACK PAIN WITH RIGHT-SIDED SCIATICA: Primary | ICD-10-CM

## 2019-04-30 NOTE — PROGRESS NOTES
PHYSICAL THERAPY DISCHARGE SUMMARY    Name: Supa Bass  Referring Provider: Taqueria Santiago MD  PT Order: PT evaluate and treat   Clinical #: [id number]  Discharge Summary Date: 4/30/2019  Diagnosis:   1. Chronic right-sided low back pain with right-sided sciatica     2. Muscle weakness         Patient was seen for 11 OP PT visits from 2-5-19 to 3-18-19. Pt cancelled 3 scheduled sessions. Treatment included: evaluation, HEP, pt education, joint and soft tissue mobilizations, ther ex, and modalities with US, IFC and . Pt was called on 4-30-19.  Pt reported he was doing fine and felt he did not need to return to PT.   PT unable to fully assess goal achievement though appears to have met functional goals. This patient is discharged from OP PT Services.

## 2019-05-16 ENCOUNTER — PATIENT OUTREACH (OUTPATIENT)
Dept: ADMINISTRATIVE | Facility: HOSPITAL | Age: 49
End: 2019-05-16

## 2019-05-16 NOTE — PROGRESS NOTES
There are no preventive care reminders to display for this patient.  Chart review complete/scrubbed 05/16/2019  Future Appointments   Date Time Provider Department Center   5/21/2019  8:15 AM [facility]   5/29/2019  8:10 AM Taqueria Santiago MD [facility]

## 2019-05-21 ENCOUNTER — LAB VISIT (OUTPATIENT)
Dept: LAB | Facility: HOSPITAL | Age: 49
End: 2019-05-21
Attending: INTERNAL MEDICINE
Payer: OTHER GOVERNMENT

## 2019-05-21 DIAGNOSIS — I10 ESSENTIAL HYPERTENSION: ICD-10-CM

## 2019-05-21 DIAGNOSIS — E78.5 DYSLIPIDEMIA: ICD-10-CM

## 2019-05-21 DIAGNOSIS — Z00.00 ROUTINE PHYSICAL EXAMINATION: ICD-10-CM

## 2019-05-21 LAB
ALBUMIN SERPL BCP-MCNC: 3.8 G/DL (ref 3.5–5.2)
ALP SERPL-CCNC: 66 U/L (ref 55–135)
ALT SERPL W/O P-5'-P-CCNC: 20 U/L (ref 10–44)
ANION GAP SERPL CALC-SCNC: 4 MMOL/L (ref 8–16)
AST SERPL-CCNC: 18 U/L (ref 10–40)
BILIRUB SERPL-MCNC: 0.8 MG/DL (ref 0.1–1)
BUN SERPL-MCNC: 13 MG/DL (ref 6–20)
CALCIUM SERPL-MCNC: 9.6 MG/DL (ref 8.7–10.5)
CHLORIDE SERPL-SCNC: 111 MMOL/L (ref 95–110)
CHOLEST SERPL-MCNC: 128 MG/DL (ref 120–199)
CHOLEST/HDLC SERPL: 3.4 {RATIO} (ref 2–5)
CO2 SERPL-SCNC: 28 MMOL/L (ref 23–29)
CREAT SERPL-MCNC: 1.3 MG/DL (ref 0.5–1.4)
EST. GFR  (AFRICAN AMERICAN): >60 ML/MIN/1.73 M^2
EST. GFR  (NON AFRICAN AMERICAN): >60 ML/MIN/1.73 M^2
GLUCOSE SERPL-MCNC: 101 MG/DL (ref 70–110)
HDLC SERPL-MCNC: 38 MG/DL (ref 40–75)
HDLC SERPL: 29.7 % (ref 20–50)
LDLC SERPL CALC-MCNC: 60.6 MG/DL (ref 63–159)
NONHDLC SERPL-MCNC: 90 MG/DL
POTASSIUM SERPL-SCNC: 4.2 MMOL/L (ref 3.5–5.1)
PROT SERPL-MCNC: 6.5 G/DL (ref 6–8.4)
SODIUM SERPL-SCNC: 143 MMOL/L (ref 136–145)
TRIGL SERPL-MCNC: 147 MG/DL (ref 30–150)

## 2019-05-21 PROCEDURE — 36415 COLL VENOUS BLD VENIPUNCTURE: CPT | Mod: PO

## 2019-05-21 PROCEDURE — 80061 LIPID PANEL: CPT

## 2019-05-21 PROCEDURE — 80053 COMPREHEN METABOLIC PANEL: CPT

## 2019-05-29 ENCOUNTER — OFFICE VISIT (OUTPATIENT)
Dept: FAMILY MEDICINE | Facility: CLINIC | Age: 49
End: 2019-05-29
Payer: OTHER GOVERNMENT

## 2019-05-29 VITALS
BODY MASS INDEX: 28.84 KG/M2 | WEIGHT: 179.44 LBS | SYSTOLIC BLOOD PRESSURE: 118 MMHG | HEART RATE: 54 BPM | RESPIRATION RATE: 18 BRPM | OXYGEN SATURATION: 96 % | DIASTOLIC BLOOD PRESSURE: 78 MMHG | HEIGHT: 66 IN

## 2019-05-29 DIAGNOSIS — I10 ESSENTIAL HYPERTENSION: Primary | ICD-10-CM

## 2019-05-29 DIAGNOSIS — E78.5 DYSLIPIDEMIA: ICD-10-CM

## 2019-05-29 DIAGNOSIS — K21.9 GASTROESOPHAGEAL REFLUX DISEASE, ESOPHAGITIS PRESENCE NOT SPECIFIED: ICD-10-CM

## 2019-05-29 PROCEDURE — 99214 PR OFFICE/OUTPT VISIT, EST, LEVL IV, 30-39 MIN: ICD-10-PCS | Mod: S$PBB,,, | Performed by: INTERNAL MEDICINE

## 2019-05-29 PROCEDURE — 99999 PR PBB SHADOW E&M-EST. PATIENT-LVL III: CPT | Mod: PBBFAC,,, | Performed by: INTERNAL MEDICINE

## 2019-05-29 PROCEDURE — 99214 OFFICE O/P EST MOD 30 MIN: CPT | Mod: S$PBB,,, | Performed by: INTERNAL MEDICINE

## 2019-05-29 PROCEDURE — 99213 OFFICE O/P EST LOW 20 MIN: CPT | Mod: PBBFAC,PO | Performed by: INTERNAL MEDICINE

## 2019-05-29 PROCEDURE — 99999 PR PBB SHADOW E&M-EST. PATIENT-LVL III: ICD-10-PCS | Mod: PBBFAC,,, | Performed by: INTERNAL MEDICINE

## 2019-05-29 RX ORDER — FINASTERIDE 1 MG/1
1 TABLET, FILM COATED ORAL DAILY
Refills: 6 | COMMUNITY
Start: 2019-05-20 | End: 2019-12-31 | Stop reason: SDUPTHER

## 2019-05-29 RX ORDER — FENOFIBRATE 54 MG/1
54 TABLET ORAL DAILY
Qty: 30 TABLET | Refills: 6 | Status: SHIPPED | OUTPATIENT
Start: 2019-05-29 | End: 2019-11-11 | Stop reason: SDUPTHER

## 2019-05-29 NOTE — PROGRESS NOTES
Subjective:       Patient ID: Supa Bass is a 48 y.o. male.    Chief Complaint: Hypertension    High triglycerides -  Improved on fenofibrate and ls changes;   GERD - controlled  Elevated LDL - controlled on statin; goal < 130 age, htn  HTN - controlled    Recall:   [facility] and uses [facility] for acute issues; will bring/send labs to us    RAUL on cpap - not working right and needs a referral  PTSD from war - controlled on Wellbutrin and Lunesta    Review of Systems   Constitutional: Positive for activity change. Negative for appetite change, fever and unexpected weight change.   HENT: Negative for hearing loss, nosebleeds, rhinorrhea and trouble swallowing.    Eyes: Negative for discharge and visual disturbance.   Respiratory: Negative for choking, chest tightness, shortness of breath and wheezing.    Cardiovascular: Negative for chest pain and palpitations.   Gastrointestinal: Positive for constipation. Negative for abdominal pain, blood in stool, diarrhea, nausea and vomiting.   Endocrine: Negative for polydipsia and polyuria.   Genitourinary: Negative for difficulty urinating, hematuria and urgency.   Musculoskeletal: Negative for arthralgias, joint swelling and neck pain.   Skin: Negative for rash and wound.   Neurological: Negative for dizziness, syncope, weakness and headaches.   Psychiatric/Behavioral: Negative for confusion and dysphoric mood.       Objective:      Vitals:    05/29/19 0810   BP: 118/78   Pulse: (!) 54   Resp: 18     Physical Exam   Constitutional: He appears well-nourished.   Eyes: Conjunctivae and EOM are normal.   Neck: Normal range of motion.   Cardiovascular: Normal rate and regular rhythm.   Pulmonary/Chest: Effort normal and breath sounds normal.   Musculoskeletal:   Normal ROM bilateral    Neurological: No cranial nerve deficit (grossly intact).   Skin: Skin is warm and dry.   Psychiatric: He has a normal mood and affect.   Alert and  orientated   Vitals reviewed.        Assessment:       1. Essential hypertension    2. Dyslipidemia    3. Gastroesophageal reflux disease, esophagitis presence not specified        Plan:       Essential hypertension  -     Lipid panel; Future; Expected date: 11/25/2019  -     Comprehensive metabolic panel; Future; Expected date: 11/25/2019    Dyslipidemia  -     Lipid panel; Future; Expected date: 11/25/2019  -     Comprehensive metabolic panel; Future; Expected date: 11/25/2019  -     fenofibrate (TRICOR) 54 MG tablet; Take 1 tablet (54 mg total) by mouth once daily.  Dispense: 30 tablet; Refill: 6    Gastroesophageal reflux disease, esophagitis presence not specified            Medication List with Changes/Refills   Current Medications    ASPIRIN (ECOTRIN) 81 MG EC TABLET    Take 81 mg by mouth once daily.    ATORVASTATIN (LIPITOR) 40 MG TABLET    Take 40 mg by mouth once daily.    BUPROPION (WELLBUTRIN SR) 150 MG TBSR 12 HR TABLET    Take 150 mg by mouth 2 (two) times daily.    ESOMEPRAZOLE (NEXIUM) 40 MG CAPSULE    Take 40 mg by mouth once daily.    ESZOPICLONE (LUNESTA) 2 MG TAB    Take 2 mg by mouth nightly.    FINASTERIDE (PROPECIA) 1 MG TABLET    Take 1 tablet by mouth once daily.    LOSARTAN (COZAAR) 100 MG TABLET    Take 100 mg by mouth once daily.    METOPROLOL SUCCINATE (TOPROL-XL) 50 MG 24 HR TABLET    Take 50 mg by mouth once daily.    NAPROXEN (NAPROSYN) 500 MG TABLET    Take 1 tablet (500 mg total) by mouth 2 (two) times daily as needed.   Changed and/or Refilled Medications    Modified Medication Previous Medication    FENOFIBRATE (TRICOR) 54 MG TABLET fenofibrate (TRICOR) 54 MG tablet       Take 1 tablet (54 mg total) by mouth once daily.    Take 1 tablet (54 mg total) by mouth once daily.   Discontinued Medications    METHYLPREDNISOLONE (MEDROL DOSEPACK) 4 MG TABLET    use as directed       Continue current management and monitor.    Counseled on regular exercise, maintenance of a healthy weight,  balanced diet rich in fruits/vegetables and lean protein, and avoidance of unhealthy habits like smoking and excessive alcohol intake.   Also, counseled on importance of being compliant with medication, health appointments, diet and exercise.     Follow up in about 6 months (around 11/29/2019).

## 2019-06-17 ENCOUNTER — TELEPHONE (OUTPATIENT)
Dept: FAMILY MEDICINE | Facility: CLINIC | Age: 49
End: 2019-06-17

## 2019-06-17 NOTE — TELEPHONE ENCOUNTER
Call placed to number provided.   Spoke to representative, and advised that PCP is out of office, need to fax to Dr Yi's number for assistance with covering while PCP is out of office.

## 2019-06-17 NOTE — TELEPHONE ENCOUNTER
----- Message from Rosita Vaca sent at 6/17/2019 12:34 PM CDT -----  Type: Needs Medical Advice    Who Called:  Tara with [facility]  Symptoms (please be specific):  na  How long has patient had these symptoms:  litzy  Pharmacy name and phone #:  litzy  Best Call Back Number: [phone]  Additional Information: Calling to check the status of the order for the c-pap supplies that was requested/please advise

## 2019-11-05 ENCOUNTER — PATIENT OUTREACH (OUTPATIENT)
Dept: ADMINISTRATIVE | Facility: HOSPITAL | Age: 49
End: 2019-11-05

## 2019-11-11 DIAGNOSIS — E78.5 DYSLIPIDEMIA: ICD-10-CM

## 2019-11-11 NOTE — PROGRESS NOTES
Refill Authorization Note     is requesting a refill authorization.    Brief assessment and rationale for refill: APPROVE: need labs  Name and strength of medication: fenofibrate (TRICOR) 54 MG tablet  Medication-related problems identified: Requires labs    Medication Therapy Plan: NTBO(cbc with differential)    Medication reconciliation completed: No   Pharmacist Review Requested: Yes          How patient will take medication: t1 po qd          Comments:   Requested Prescriptions   Pending Prescriptions Disp Refills    fenofibrate (TRICOR) 54 MG tablet 90 tablet 0     Sig: Take 1 tablet (54 mg total) by mouth once daily.       Cardiovascular:  Antilipid - Fibric Acid Derivatives Failed - 11/11/2019  4:13 PM        Failed - WBC in normal range and within 360 days     WBC   Date Value Ref Range Status   04/20/2018 7.35 3.90 - 12.70 K/uL Final   11/17/2017 6.50 3.90 - 12.70 K/uL Final              Passed - Patient is at least 18 years old        Passed - Office visit in past 12 months or future 90 days     Recent Outpatient Visits            5 months ago Essential hypertension    [facility] Taqueria Santiago MD    9 months ago Acute right-sided low back pain with right-sided sciatica    [facility] Taqueria [facility] MD    11 months ago Routine physical examination    [facility] Taqueria Santiago MD    1 year ago Post-operative state    [facility] Mamadou Villarreal DPM    1 year ago Post-operative [facility] Mamadou Villarreal DPM          Future Appointments              In 1 month LAB, [place] [facility] [place]    In 1 month Taqueria Santiago MD [facility] [place] [facility]                Passed - Lipid Panel completed in last 360 days     Lab Results   Component Value Date    CHOL 128 05/21/2019    HDL 38 (L) 05/21/2019    LDLCALC 60.6 (L) 05/21/2019    TRIG 147 05/21/2019             Passed - ALT is  94 or below and within 360 days     ALT   Date Value Ref Range Status   05/21/2019 20 10 - 44 U/L Final   11/26/2018 27 10 - 44 U/L Final   05/22/2018 20 10 - 44 U/L Final              Passed - AST is 54 or below and within 360 days     AST   Date Value Ref Range Status   05/21/2019 18 10 - 40 U/L Final   11/26/2018 25 10 - 40 U/L Final   05/22/2018 19 10 - 40 U/L Final              Passed - Cr is 1.4 or below and within 360 days     Creatinine   Date Value Ref Range Status   05/21/2019 1.3 0.5 - 1.4 mg/dL Final   11/26/2018 1.2 0.5 - 1.4 mg/dL Final   05/22/2018 1.3 0.5 - 1.4 mg/dL Final              Passed - eGFR is 30 or above and within 360 days     eGFR if non    Date Value Ref Range Status   05/21/2019 >60.0 >60 mL/min/1.73 m^2 Final     Comment:     Calculation used to obtain the estimated glomerular filtration  rate (eGFR) is the CKD-EPI equation.      11/26/2018 >60.0 >60 mL/min/1.73 m^2 Final     Comment:     Calculation used to obtain the estimated glomerular filtration  rate (eGFR) is the CKD-EPI equation.      05/22/2018 >60.0 >60 mL/min/1.73 m^2 Final     Comment:     Calculation used to obtain the estimated glomerular filtration  rate (eGFR) is the CKD-EPI equation.

## 2019-11-12 RX ORDER — FENOFIBRATE 54 MG/1
54 TABLET ORAL DAILY
Qty: 90 TABLET | Refills: 0 | Status: SHIPPED | OUTPATIENT
Start: 2019-11-12 | End: 2020-05-21 | Stop reason: SDUPTHER

## 2019-12-23 ENCOUNTER — LAB VISIT (OUTPATIENT)
Dept: LAB | Facility: HOSPITAL | Age: 49
End: 2019-12-23
Attending: INTERNAL MEDICINE
Payer: OTHER GOVERNMENT

## 2019-12-23 DIAGNOSIS — I10 ESSENTIAL HYPERTENSION: ICD-10-CM

## 2019-12-23 DIAGNOSIS — E78.5 DYSLIPIDEMIA: ICD-10-CM

## 2019-12-23 LAB
ALBUMIN SERPL BCP-MCNC: 4 G/DL (ref 3.5–5.2)
ALP SERPL-CCNC: 58 U/L (ref 55–135)
ALT SERPL W/O P-5'-P-CCNC: 25 U/L (ref 10–44)
ANION GAP SERPL CALC-SCNC: 7 MMOL/L (ref 8–16)
AST SERPL-CCNC: 20 U/L (ref 10–40)
BILIRUB SERPL-MCNC: 1 MG/DL (ref 0.1–1)
BUN SERPL-MCNC: 20 MG/DL (ref 6–20)
CALCIUM SERPL-MCNC: 9.6 MG/DL (ref 8.7–10.5)
CHLORIDE SERPL-SCNC: 108 MMOL/L (ref 95–110)
CHOLEST SERPL-MCNC: 141 MG/DL (ref 120–199)
CHOLEST/HDLC SERPL: 3.5 {RATIO} (ref 2–5)
CO2 SERPL-SCNC: 28 MMOL/L (ref 23–29)
CREAT SERPL-MCNC: 1.6 MG/DL (ref 0.5–1.4)
ERYTHROCYTE [DISTWIDTH] IN BLOOD BY AUTOMATED COUNT: 11.9 % (ref 11.5–14.5)
EST. GFR  (AFRICAN AMERICAN): 58 ML/MIN/1.73 M^2
EST. GFR  (NON AFRICAN AMERICAN): 50.2 ML/MIN/1.73 M^2
GLUCOSE SERPL-MCNC: 103 MG/DL (ref 70–110)
HCT VFR BLD AUTO: 44.1 % (ref 40–54)
HDLC SERPL-MCNC: 40 MG/DL (ref 40–75)
HDLC SERPL: 28.4 % (ref 20–50)
HGB BLD-MCNC: 14.6 G/DL (ref 14–18)
LDLC SERPL CALC-MCNC: 76.6 MG/DL (ref 63–159)
MCH RBC QN AUTO: 32.7 PG (ref 27–31)
MCHC RBC AUTO-ENTMCNC: 33.1 G/DL (ref 32–36)
MCV RBC AUTO: 99 FL (ref 82–98)
NONHDLC SERPL-MCNC: 101 MG/DL
PLATELET # BLD AUTO: 221 K/UL (ref 150–350)
PMV BLD AUTO: 10.7 FL (ref 9.2–12.9)
POTASSIUM SERPL-SCNC: 4.3 MMOL/L (ref 3.5–5.1)
PROT SERPL-MCNC: 6.6 G/DL (ref 6–8.4)
RBC # BLD AUTO: 4.46 M/UL (ref 4.6–6.2)
SODIUM SERPL-SCNC: 143 MMOL/L (ref 136–145)
TRIGL SERPL-MCNC: 122 MG/DL (ref 30–150)
WBC # BLD AUTO: 7.27 K/UL (ref 3.9–12.7)

## 2019-12-23 PROCEDURE — 85027 COMPLETE CBC AUTOMATED: CPT

## 2019-12-23 PROCEDURE — 80061 LIPID PANEL: CPT

## 2019-12-23 PROCEDURE — 36415 COLL VENOUS BLD VENIPUNCTURE: CPT | Mod: PO

## 2019-12-23 PROCEDURE — 80053 COMPREHEN METABOLIC PANEL: CPT

## 2019-12-31 ENCOUNTER — OFFICE VISIT (OUTPATIENT)
Dept: FAMILY MEDICINE | Facility: CLINIC | Age: 49
End: 2019-12-31
Payer: OTHER GOVERNMENT

## 2019-12-31 VITALS
HEART RATE: 57 BPM | SYSTOLIC BLOOD PRESSURE: 118 MMHG | BODY MASS INDEX: 29.48 KG/M2 | HEIGHT: 66 IN | OXYGEN SATURATION: 98 % | WEIGHT: 183.44 LBS | DIASTOLIC BLOOD PRESSURE: 78 MMHG

## 2019-12-31 DIAGNOSIS — E78.5 DYSLIPIDEMIA: ICD-10-CM

## 2019-12-31 DIAGNOSIS — I10 ESSENTIAL HYPERTENSION: ICD-10-CM

## 2019-12-31 DIAGNOSIS — R79.89 ABNORMAL CBC: ICD-10-CM

## 2019-12-31 DIAGNOSIS — Z00.00 ROUTINE PHYSICAL EXAMINATION: Primary | ICD-10-CM

## 2019-12-31 DIAGNOSIS — R79.89 PRERENAL AZOTEMIA: ICD-10-CM

## 2019-12-31 PROCEDURE — 99999 PR PBB SHADOW E&M-EST. PATIENT-LVL III: CPT | Mod: PBBFAC,,, | Performed by: INTERNAL MEDICINE

## 2019-12-31 PROCEDURE — 99213 OFFICE O/P EST LOW 20 MIN: CPT | Mod: PBBFAC,PO | Performed by: INTERNAL MEDICINE

## 2019-12-31 PROCEDURE — 99396 PREV VISIT EST AGE 40-64: CPT | Mod: S$PBB,,, | Performed by: INTERNAL MEDICINE

## 2019-12-31 PROCEDURE — 99999 PR PBB SHADOW E&M-EST. PATIENT-LVL III: ICD-10-PCS | Mod: PBBFAC,,, | Performed by: INTERNAL MEDICINE

## 2019-12-31 PROCEDURE — 99396 PR PREVENTIVE VISIT,EST,40-64: ICD-10-PCS | Mod: S$PBB,,, | Performed by: INTERNAL MEDICINE

## 2019-12-31 RX ORDER — FINASTERIDE 1 MG/1
1 TABLET, FILM COATED ORAL DAILY
Qty: 90 TABLET | Refills: 2 | Status: SHIPPED | OUTPATIENT
Start: 2019-12-31

## 2019-12-31 NOTE — PROGRESS NOTES
Subjective:       Patient ID: Supa Bass is a 49 y.o. male.    Chief Complaint: Annual Exam    Here for routine health maintenance.    Strong FH autoimmune diseases  - mom, sister, daughter    high triglycerides -  Improved on fenofibrate and ls changes;   HLD - controlled on statin; goal < 130 age, htn  HTN - controlled    Recall:   [facility] and [facility] for acute issues; will bring/send labs to us    RAUL on cpap?  PTSD from war - controlled on Wellbutrin and Lunesta    Review of Systems   Constitutional: Negative for appetite change and fever.   HENT: Negative for nosebleeds and trouble swallowing.    Eyes: Negative for discharge and visual disturbance.   Respiratory: Negative for choking and shortness of breath.    Cardiovascular: Negative for chest pain and palpitations.   Gastrointestinal: Negative for abdominal pain, nausea and vomiting.   Musculoskeletal: Negative for arthralgias and joint swelling.   Skin: Negative for rash and wound.   Neurological: Negative for dizziness and syncope.   Psychiatric/Behavioral: Negative for confusion and dysphoric mood.       Objective:      Vitals:    12/31/19 1031   BP: 118/78   Pulse: (!) 57     Physical Exam   Constitutional: He appears well-nourished.   Eyes: Conjunctivae and EOM are normal.   Neck: Trachea normal and normal range of motion. No thyromegaly present.   Cardiovascular: Normal heart sounds.   Edema negative   Pulmonary/Chest: Effort normal and breath sounds normal.   Abdominal: Soft. There is no hepatomegaly.   Musculoskeletal:   ROM normal bilateral except limited extension right elbow  Strength normal bilateral   Neurological: He has normal reflexes. No cranial nerve deficit.   Skin: Skin is warm, dry and intact.   Psychiatric: He has a normal mood and affect.   Alert and Oriented    Vitals reviewed.        Assessment:       1. Routine physical examination    2. Essential hypertension    3. Prerenal  azotemia    4. Abnormal CBC    5. Dyslipidemia        Plan:       Routine physical examination    Essential hypertension  -     Comprehensive metabolic panel; Future; Expected date: 06/28/2020    Prerenal azotemia  -     Comprehensive metabolic panel; Future; Expected date: 06/28/2020    Abnormal CBC  -     CBC auto differential; Future; Expected date: 06/28/2020    Dyslipidemia  -     Comprehensive metabolic panel; Future; Expected date: 06/28/2020  -     Lipid panel; Future; Expected date: 06/28/2020    Other orders  -     finasteride (PROPECIA) 1 mg tablet; Take 1 tablet (1 mg total) by mouth once daily.  Dispense: 90 tablet; Refill: 2            Medication List with Changes/Refills   Current Medications    ASPIRIN (ECOTRIN) 81 MG EC TABLET    Take 81 mg by mouth once daily.    ATORVASTATIN (LIPITOR) 40 MG TABLET    Take 40 mg by mouth once daily.    BUPROPION (WELLBUTRIN SR) 150 MG TBSR 12 HR TABLET    Take 150 mg by mouth 2 (two) times daily.    ESOMEPRAZOLE (NEXIUM) 40 MG CAPSULE    Take 40 mg by mouth once daily.    ESZOPICLONE (LUNESTA) 2 MG TAB    Take 2 mg by mouth nightly.    FENOFIBRATE (TRICOR) 54 MG TABLET    Take 1 tablet (54 mg total) by mouth once daily.    LOSARTAN (COZAAR) 100 MG TABLET    Take 100 mg by mouth once daily.    METOPROLOL SUCCINATE (TOPROL-XL) 50 MG 24 HR TABLET    Take 50 mg by mouth once daily.    NAPROXEN (NAPROSYN) 500 MG TABLET    Take 1 tablet (500 mg total) by mouth 2 (two) times daily as needed.   Changed and/or Refilled Medications    Modified Medication Previous Medication    FINASTERIDE (PROPECIA) 1 MG TABLET finasteride (PROPECIA) 1 mg tablet       Take 1 tablet (1 mg total) by mouth once daily.    Take 1 tablet by mouth once daily.     Wellness reviewed  Continue current management    Counseled on regular exercise, maintenance of a healthy weight, balanced diet rich in fruits/vegetables and lean protein, and avoidance of unhealthy habits like smoking and excessive  "alcohol intake.   Also, counseled on importance of being compliant with medication, health appointments, diet and exercise.     Follow up in about 6 months (around 6/30/2020).    "This note will not be shared with the patient."  "

## 2020-01-13 NOTE — TELEPHONE ENCOUNTER
----- Message from ARELIS Wang MD sent at 5/18/2017  4:44 PM CDT -----  Call with US results.  Kidneys appear normal  
Left message for pt to call office for test results.  
Pt informed of results at his visit with dr hanley today.  
not applicable (Male)

## 2020-02-10 ENCOUNTER — OFFICE VISIT (OUTPATIENT)
Dept: FAMILY MEDICINE | Facility: CLINIC | Age: 50
End: 2020-02-10
Payer: OTHER GOVERNMENT

## 2020-02-10 VITALS
DIASTOLIC BLOOD PRESSURE: 78 MMHG | OXYGEN SATURATION: 96 % | WEIGHT: 180.75 LBS | HEART RATE: 49 BPM | SYSTOLIC BLOOD PRESSURE: 110 MMHG | BODY MASS INDEX: 29.18 KG/M2

## 2020-02-10 DIAGNOSIS — B96.89 BACTERIAL SINUSITIS: Primary | ICD-10-CM

## 2020-02-10 DIAGNOSIS — J32.9 BACTERIAL SINUSITIS: Primary | ICD-10-CM

## 2020-02-10 PROCEDURE — 99214 OFFICE O/P EST MOD 30 MIN: CPT | Mod: S$PBB,,, | Performed by: PHYSICIAN ASSISTANT

## 2020-02-10 PROCEDURE — 99999 PR PBB SHADOW E&M-EST. PATIENT-LVL III: CPT | Mod: PBBFAC,,, | Performed by: PHYSICIAN ASSISTANT

## 2020-02-10 PROCEDURE — 99214 PR OFFICE/OUTPT VISIT, EST, LEVL IV, 30-39 MIN: ICD-10-PCS | Mod: S$PBB,,, | Performed by: PHYSICIAN ASSISTANT

## 2020-02-10 PROCEDURE — 99999 PR PBB SHADOW E&M-EST. PATIENT-LVL III: ICD-10-PCS | Mod: PBBFAC,,, | Performed by: PHYSICIAN ASSISTANT

## 2020-02-10 PROCEDURE — 99213 OFFICE O/P EST LOW 20 MIN: CPT | Mod: PBBFAC,PO | Performed by: PHYSICIAN ASSISTANT

## 2020-02-10 RX ORDER — PREDNISONE 10 MG/1
TABLET ORAL
Qty: 9 TABLET | Refills: 0 | Status: SHIPPED | OUTPATIENT
Start: 2020-02-10 | End: 2020-02-16

## 2020-02-10 RX ORDER — AMOXICILLIN AND CLAVULANATE POTASSIUM 875; 125 MG/1; MG/1
1 TABLET, FILM COATED ORAL EVERY 12 HOURS
Qty: 20 TABLET | Refills: 0 | Status: SHIPPED | OUTPATIENT
Start: 2020-02-10 | End: 2020-02-20

## 2020-03-10 ENCOUNTER — OFFICE VISIT (OUTPATIENT)
Dept: FAMILY MEDICINE | Facility: CLINIC | Age: 50
End: 2020-03-10
Payer: OTHER GOVERNMENT

## 2020-03-10 VITALS
BODY MASS INDEX: 29.23 KG/M2 | SYSTOLIC BLOOD PRESSURE: 102 MMHG | HEIGHT: 66 IN | HEART RATE: 46 BPM | WEIGHT: 181.88 LBS | DIASTOLIC BLOOD PRESSURE: 86 MMHG

## 2020-03-10 DIAGNOSIS — H60.92 OTITIS EXTERNA OF LEFT EAR, UNSPECIFIED CHRONICITY, UNSPECIFIED TYPE: Primary | ICD-10-CM

## 2020-03-10 PROCEDURE — 99213 OFFICE O/P EST LOW 20 MIN: CPT | Mod: S$PBB,,, | Performed by: NURSE PRACTITIONER

## 2020-03-10 PROCEDURE — 99999 PR PBB SHADOW E&M-EST. PATIENT-LVL III: ICD-10-PCS | Mod: PBBFAC,,, | Performed by: NURSE PRACTITIONER

## 2020-03-10 PROCEDURE — 99213 OFFICE O/P EST LOW 20 MIN: CPT | Mod: PBBFAC,PO | Performed by: NURSE PRACTITIONER

## 2020-03-10 PROCEDURE — 99999 PR PBB SHADOW E&M-EST. PATIENT-LVL III: CPT | Mod: PBBFAC,,, | Performed by: NURSE PRACTITIONER

## 2020-03-10 PROCEDURE — 99213 PR OFFICE/OUTPT VISIT, EST, LEVL III, 20-29 MIN: ICD-10-PCS | Mod: S$PBB,,, | Performed by: NURSE PRACTITIONER

## 2020-03-10 RX ORDER — NEOMYCIN SULFATE, POLYMYXIN B SULFATE AND HYDROCORTISONE 10; 3.5; 1 MG/ML; MG/ML; [USP'U]/ML
3 SUSPENSION/ DROPS AURICULAR (OTIC) 3 TIMES DAILY
Qty: 10 ML | Refills: 0 | Status: SHIPPED | OUTPATIENT
Start: 2020-03-10

## 2020-03-10 RX ORDER — PREDNISONE 10 MG/1
10 TABLET ORAL DAILY
Qty: 3 TABLET | Refills: 0 | Status: SHIPPED | OUTPATIENT
Start: 2020-03-10

## 2020-03-10 NOTE — PROGRESS NOTES
Subjective:       Patient ID: Supa Bass is a 49 y.o. male.    Chief Complaint: Otalgia    Patient is having a feeling of the left ear being clogged, pain with chewing, pain when lying on it, notices a crackiling sound. He did use peroxide a few days ago, no drainage. He has used q tips a few times.     Past Medical History:  No date: GERD (gastroesophageal reflux disease)  No date: Hearing loss  No date: HTN (hypertension)  No date: Hyperlipidemia  No date: RAUL (obstructive sleep apnea)      Comment:  CPAP at night  No date: PTSD (post-traumatic stress disorder)    Past Surgical History:  No date: ELBOW SURGERY      Comment:  right  No date: FINGER SURGERY      Comment:  right ring finger  No date: FOOT SURGERY      Comment:  right  05/04/2018: FOOT SURGERY; Right  No date: SHOULDER SURGERY; Left    Review of patient's family history indicates:  Problem: Crohn's disease      Relation: Sister          Age of Onset: (Not Specified)  Problem: Kidney failure      Relation: Daughter          Age of Onset: (Not Specified)          Comment: autoimmune disease  Problem: Lupus      Relation: Mother          Age of Onset: (Not Specified)  Problem: Scleroderma      Relation: Mother          Age of Onset: (Not Specified)      Social History    Socioeconomic History      Marital status:       Spouse name: Not on file      Number of children: Not on file      Years of education: Not on file      Highest education level: Not on file    Occupational History      Not on file    Social Needs      Financial resource strain: Not on file      Food insecurity:        Worry: Not on file        Inability: Not on file      Transportation needs:        Medical: Not on file        Non-medical: Not on file    Tobacco Use      Smoking status: Never Smoker      Smokeless tobacco: Never Used    Substance and Sexual Activity      Alcohol use: No      Drug use: No      Sexual activity: Not on file    Lifestyle      Physical  activity:        Days per week: Not on file        Minutes per session: Not on file      Stress: Not on file    Relationships      Social connections:        Talks on phone: Not on file        Gets together: Not on file        Attends Confucianist service: Not on file        Active member of club or organization: Not on file        Attends meetings of clubs or organizations: Not on file        Relationship status: Not on file    Other Topics      Concerns:        Not on file    Social History Narrative      Not on file      Current Outpatient Medications:  aspirin (ECOTRIN) 81 MG EC tablet, Take 81 mg by mouth once daily., Disp: , Rfl:   atorvastatin (LIPITOR) 40 MG tablet, Take 40 mg by mouth once daily., Disp: , Rfl:   buPROPion (WELLBUTRIN SR) 150 MG TBSR 12 hr tablet, Take 150 mg by mouth 2 (two) times daily., Disp: , Rfl:   esomeprazole (NEXIUM) 40 MG capsule, Take 40 mg by mouth once daily., Disp: , Rfl:   eszopiclone (LUNESTA) 2 MG Tab, Take 2 mg by mouth nightly., Disp: , Rfl:   fenofibrate (TRICOR) 54 MG tablet, Take 1 tablet (54 mg total) by mouth once daily., Disp: 90 tablet, Rfl: 0  finasteride (PROPECIA) 1 mg tablet, Take 1 tablet (1 mg total) by mouth once daily., Disp: 90 tablet, Rfl: 2  losartan (COZAAR) 100 MG tablet, Take 100 mg by mouth once daily., Disp: , Rfl:   metoprolol succinate (TOPROL-XL) 50 MG 24 hr tablet, Take 50 mg by mouth once daily., Disp: , Rfl:   naproxen (NAPROSYN) 500 MG tablet, Take 1 tablet (500 mg total) by mouth 2 (two) times daily as needed., Disp: 45 tablet, Rfl: 1    No current facility-administered medications for this visit.       Review of patient's allergies indicates:   -- Hydrocodone -- Rash    Review of Systems   Constitutional: Negative for chills and fever.   HENT: Positive for ear pain, hearing loss and postnasal drip. Negative for congestion, rhinorrhea, sinus pressure and sinus pain.    Respiratory: Negative.    Cardiovascular: Negative.    Gastrointestinal:  Negative.    Genitourinary: Negative.    Neurological: Negative.  Negative for dizziness, light-headedness and headaches.       Objective:      Physical Exam   Constitutional: He is oriented to person, place, and time. No distress.   HENT:   Head: Normocephalic and atraumatic.   Right Ear: No tenderness. No middle ear effusion.   Left Ear: There is drainage, swelling and tenderness.   Nose: No mucosal edema or rhinorrhea.   Mouth/Throat: Uvula is midline and oropharynx is clear and moist.   Cardiovascular: Normal rate.   Pulmonary/Chest: Effort normal.   Neurological: He is alert and oriented to person, place, and time.   Skin: He is not diaphoretic.       Assessment:       1. Otitis externa of left ear, unspecified chronicity, unspecified type        Plan:       1. Otitis externa of left ear, unspecified chronicity, unspecified type  Avoid water, dont use q tips, follow up ent if not resolving.   - neomycin-polymyxin-hydrocortisone (CORTISPORIN) 3.5-10,000-1 mg/mL-unit/mL-% otic suspension; Place 3 drops into both ears 3 (three) times daily.  Dispense: 10 mL; Refill: 0  - predniSONE (DELTASONE) 10 MG tablet; Take 1 tablet (10 mg total) by mouth once daily.  Dispense: 3 tablet; Refill: 0

## 2020-05-05 ENCOUNTER — PATIENT MESSAGE (OUTPATIENT)
Dept: ADMINISTRATIVE | Facility: HOSPITAL | Age: 50
End: 2020-05-05

## 2020-05-21 DIAGNOSIS — E78.5 DYSLIPIDEMIA: ICD-10-CM

## 2020-05-21 RX ORDER — FENOFIBRATE 54 MG/1
54 TABLET ORAL DAILY
Qty: 90 TABLET | Refills: 3 | Status: SHIPPED | OUTPATIENT
Start: 2020-05-21 | End: 2020-06-15

## 2020-05-21 NOTE — TELEPHONE ENCOUNTER
----- Message from Yenni Zana sent at 5/21/2020 10:21 AM CDT -----  Type:  RX Refill Request    Who Called:  Supa  Refill or New Rx:  refill  RX Name and Strength:  fenofibrate (TRICOR) 54 MG tablet  How is the patient currently taking it? (ex. 1XDay):  Once daily   Is this a 30 day or 90 day RX:  90  Preferred Pharmacy with phone number:    [id number] [address] & [address] [address]  Phone: [phone] Fax: [phone]    Local or Mail Order:  local  Ordering Provider:  Dr Jack Rios Call Back Number:  [phone]  Additional Information:  Thank you!

## 2021-04-06 ENCOUNTER — PATIENT MESSAGE (OUTPATIENT)
Dept: ADMINISTRATIVE | Facility: HOSPITAL | Age: 51
End: 2021-04-06

## 2021-05-10 ENCOUNTER — PATIENT MESSAGE (OUTPATIENT)
Dept: RESEARCH | Facility: HOSPITAL | Age: 51
End: 2021-05-10

## 2021-07-07 ENCOUNTER — PATIENT MESSAGE (OUTPATIENT)
Dept: ADMINISTRATIVE | Facility: HOSPITAL | Age: 51
End: 2021-07-07

## 2022-05-31 ENCOUNTER — PATIENT MESSAGE (OUTPATIENT)
Dept: ADMINISTRATIVE | Facility: HOSPITAL | Age: 52
End: 2022-05-31
Payer: OTHER GOVERNMENT

## (undated) DEVICE — DRAPE EXTREMITY W/ABC NON-SLIP

## (undated) DEVICE — DRAPE STERI-DRAPE 1000 17X11IN

## (undated) DEVICE — SUT 3-0 VICRYL / SH (J416)

## (undated) DEVICE — GAUZE SPONGE 4X4 12PLY

## (undated) DEVICE — SYR 10CC LUER LOCK

## (undated) DEVICE — PROFILE DRILL MINI CMP FT

## (undated) DEVICE — BANDAGE ESMARK LATEX FREE 4INX

## (undated) DEVICE — APPLICATOR CHLORAPREP ORN 26ML

## (undated) DEVICE — SEE MEDLINE ITEM 157128

## (undated) DEVICE — PENCIL ROCKER SWITCH 10FT CORD

## (undated) DEVICE — LINER GLOVE POWDERFREE 8

## (undated) DEVICE — WIRE K SMALL BALL BB-TAK
Type: IMPLANTABLE DEVICE | Site: TOE | Status: NON-FUNCTIONAL
Removed: 2018-05-04

## (undated) DEVICE — BIT DRILL MTP 2 MM

## (undated) DEVICE — SPONGE DERMACEA GAUZE 4X4

## (undated) DEVICE — GLOVE SURGICAL LATEX SZ 8

## (undated) DEVICE — BLADE MEDIUM 9MM X 25MM

## (undated) DEVICE — NDL HYPO A BEVEL 25X1 1/2

## (undated) DEVICE — BLADE SURG #15 CARBON STEEL

## (undated) DEVICE — SUT ETHILON 4-0 PS2 18 BLK

## (undated) DEVICE — SEE L#120831

## (undated) DEVICE — IMPLANTABLE DEVICE
Type: IMPLANTABLE DEVICE | Site: TOE | Status: NON-FUNCTIONAL
Removed: 2018-05-04

## (undated) DEVICE — DRESSING XEROFORM FOIL PK 1X8

## (undated) DEVICE — SEE MEDLINE ITEM 157131

## (undated) DEVICE — SUT ETHILON 3-0 FS-1 30

## (undated) DEVICE — STOCKINET 4INX48

## (undated) DEVICE — BIT DRILL 2.2MM CMP FT CALIB

## (undated) DEVICE — SEE MEDLINE ITEM 146308

## (undated) DEVICE — GUIDEWIRE TROCAR TIP.062
Type: IMPLANTABLE DEVICE | Site: TOE | Status: NON-FUNCTIONAL
Removed: 2018-05-04

## (undated) DEVICE — ELECTRODE REM PLYHSV RETURN 9

## (undated) DEVICE — Device